# Patient Record
Sex: MALE | Race: WHITE | NOT HISPANIC OR LATINO | Employment: FULL TIME | URBAN - METROPOLITAN AREA
[De-identification: names, ages, dates, MRNs, and addresses within clinical notes are randomized per-mention and may not be internally consistent; named-entity substitution may affect disease eponyms.]

---

## 2023-03-25 PROBLEM — E78.5 HLD (HYPERLIPIDEMIA): Status: ACTIVE | Noted: 2023-03-25

## 2023-03-25 PROBLEM — F43.10 POSTTRAUMATIC STRESS DISORDER: Status: ACTIVE | Noted: 2023-03-25

## 2023-03-25 PROBLEM — R74.8 ELEVATED LIVER ENZYMES: Status: ACTIVE | Noted: 2023-03-25

## 2023-03-25 PROBLEM — U07.1 COVID-19: Status: ACTIVE | Noted: 2023-03-25

## 2023-03-25 PROBLEM — E55.9 VITAMIN D INSUFFICIENCY: Status: ACTIVE | Noted: 2023-03-25

## 2023-03-31 ENCOUNTER — OFFICE VISIT (OUTPATIENT)
Dept: PRIMARY CARE | Facility: CLINIC | Age: 43
End: 2023-03-31
Payer: COMMERCIAL

## 2023-03-31 VITALS
BODY MASS INDEX: 27.96 KG/M2 | RESPIRATION RATE: 14 BRPM | DIASTOLIC BLOOD PRESSURE: 70 MMHG | TEMPERATURE: 98.7 F | WEIGHT: 211 LBS | HEIGHT: 73 IN | SYSTOLIC BLOOD PRESSURE: 124 MMHG | HEART RATE: 55 BPM | OXYGEN SATURATION: 98 %

## 2023-03-31 DIAGNOSIS — F43.10 PTSD (POST-TRAUMATIC STRESS DISORDER): ICD-10-CM

## 2023-03-31 DIAGNOSIS — R74.8 ELEVATED LIVER ENZYMES: ICD-10-CM

## 2023-03-31 DIAGNOSIS — Z00.00 PERIODIC HEALTH ASSESSMENT, GENERAL SCREENING, ADULT: Primary | ICD-10-CM

## 2023-03-31 DIAGNOSIS — F98.8 ATTENTION DEFICIT DISORDER, UNSPECIFIED HYPERACTIVITY PRESENCE: ICD-10-CM

## 2023-03-31 PROCEDURE — 1036F TOBACCO NON-USER: CPT | Performed by: INTERNAL MEDICINE

## 2023-03-31 PROCEDURE — 99396 PREV VISIT EST AGE 40-64: CPT | Performed by: INTERNAL MEDICINE

## 2023-03-31 RX ORDER — BUPROPION HYDROCHLORIDE 150 MG/1
150 TABLET ORAL EVERY MORNING
Qty: 30 TABLET | Refills: 1 | Status: SHIPPED | OUTPATIENT
Start: 2023-03-31 | End: 2023-03-31 | Stop reason: SDUPTHER

## 2023-03-31 RX ORDER — BUPROPION HYDROCHLORIDE 150 MG/1
150 TABLET ORAL EVERY MORNING
Qty: 30 TABLET | Refills: 1 | Status: SHIPPED | OUTPATIENT
Start: 2023-03-31 | End: 2023-04-27

## 2023-03-31 ASSESSMENT — ENCOUNTER SYMPTOMS
CONFUSION: 1
CONSTIPATION: 0
WHEEZING: 0
NAUSEA: 0
COUGH: 0
PALPITATIONS: 0
SHORTNESS OF BREATH: 0
NERVOUS/ANXIOUS: 1
DIARRHEA: 0

## 2023-03-31 NOTE — PROGRESS NOTES
"Subjective   Patient ID: Rafael Mar is a 42 y.o. male who presents for Annual Exam (Npt physical .).  Overall doing well.  Patient is fairly active.  Denies any issues with CP,SOB or dizzy spells.  Denies any issues with HA, numbness or tingling.  No issues or changes with bowel or bladder habits.    He does have issues with stress and anxiety as well as issues with attention and what he describes as difficulty controlling impulses (for example getting in trouble at work for telling jokes)  ROS is otherwise unremarkable.       Review of Systems   Respiratory:  Negative for cough, shortness of breath and wheezing.    Cardiovascular:  Negative for chest pain, palpitations and leg swelling.   Gastrointestinal:  Negative for constipation, diarrhea and nausea.   Psychiatric/Behavioral:  Positive for confusion. The patient is nervous/anxious.        Objective   /70 (BP Location: Left arm, Patient Position: Sitting, BP Cuff Size: Adult)   Pulse 55   Temp 37.1 °C (98.7 °F) (Tympanic)   Resp 14   Ht 1.854 m (6' 1\")   Wt 95.7 kg (211 lb)   SpO2 98%   BMI 27.84 kg/m²     Physical Exam  Constitutional:       General: He is not in acute distress.     Appearance: Normal appearance. He is not ill-appearing.   HENT:      Head: Normocephalic and atraumatic.      Nose: Nose normal.   Eyes:      Extraocular Movements: Extraocular movements intact.      Conjunctiva/sclera: Conjunctivae normal.      Pupils: Pupils are equal, round, and reactive to light.   Cardiovascular:      Rate and Rhythm: Normal rate and regular rhythm.      Heart sounds: Normal heart sounds.   Pulmonary:      Effort: Pulmonary effort is normal.      Breath sounds: Normal breath sounds.   Abdominal:      General: There is no distension.   Musculoskeletal:         General: Normal range of motion.      Cervical back: Neck supple.   Neurological:      General: No focal deficit present.      Mental Status: He is alert.      Gait: Gait normal. "   Psychiatric:         Mood and Affect: Mood normal.         Behavior: Behavior normal.         Assessment/Plan   Problem List Items Addressed This Visit          Other    Elevated liver enzymes    Relevant Orders    Anti-Smooth Muscle Antibody    Ferritin    Transferrin    Sedimentation Rate     Other Visit Diagnoses       Periodic health assessment, general screening, adult    -  Primary    Relevant Medications    buPROPion XL (Wellbutrin XL) 150 mg 24 hr tablet    Other Relevant Orders    Anti-Smooth Muscle Antibody    Ferritin    Transferrin    Sedimentation Rate    Troponin I, High Sensitivity    CBC    Comprehensive Metabolic Panel    Lipid Panel    Thyroid Stimulating Hormone    Vitamin D, Total    Attention deficit disorder, unspecified hyperactivity presence        PTSD (post-traumatic stress disorder)        Relevant Orders    Referral to Psychiatry        We reviewed and discussed al of the above including previous blood test results.  He is requesting a troponin level.  He has not been having any exertional symptoms, but notices funny feeling with stress.    Physical exam is unremarkable.  We discussed the importance and benefits of a healthy diet that is both low in sugars and low in saturated fats.  We reviewed and discussed the benefits of regular physical exercise.  We also discussed the importance of stress management and good sleep hygiene.  He is agreeable to starting Wellbutrin and seeing psychiatrist.    We will continue to work on lifestyle improvements and follow-up in 2 months, sooner if any issues should arise.

## 2023-04-24 ENCOUNTER — LAB (OUTPATIENT)
Dept: LAB | Facility: LAB | Age: 43
End: 2023-04-24
Payer: COMMERCIAL

## 2023-04-24 DIAGNOSIS — Z00.00 PERIODIC HEALTH ASSESSMENT, GENERAL SCREENING, ADULT: ICD-10-CM

## 2023-04-24 DIAGNOSIS — R74.8 ELEVATED LIVER ENZYMES: ICD-10-CM

## 2023-04-24 LAB
CALCIDIOL (25 OH VITAMIN D3) (NG/ML) IN SER/PLAS: 17 NG/ML
ERYTHROCYTE DISTRIBUTION WIDTH (RATIO) BY AUTOMATED COUNT: 11.6 % (ref 11.5–14.5)
ERYTHROCYTE MEAN CORPUSCULAR HEMOGLOBIN CONCENTRATION (G/DL) BY AUTOMATED: 35.4 G/DL (ref 32–36)
ERYTHROCYTE MEAN CORPUSCULAR VOLUME (FL) BY AUTOMATED COUNT: 94 FL (ref 80–100)
ERYTHROCYTES (10*6/UL) IN BLOOD BY AUTOMATED COUNT: 4.54 X10E12/L (ref 4.5–5.9)
FERRITIN (UG/LL) IN SER/PLAS: 391 UG/L (ref 20–300)
HEMATOCRIT (%) IN BLOOD BY AUTOMATED COUNT: 42.9 % (ref 41–52)
HEMOGLOBIN (G/DL) IN BLOOD: 15.2 G/DL (ref 13.5–17.5)
LEUKOCYTES (10*3/UL) IN BLOOD BY AUTOMATED COUNT: 6.1 X10E9/L (ref 4.4–11.3)
PLATELETS (10*3/UL) IN BLOOD AUTOMATED COUNT: 224 X10E9/L (ref 150–450)
SEDIMENTATION RATE, ERYTHROCYTE: 3 MM/H (ref 0–15)
TRANSFERRIN (MG/DL) IN SER/PLAS: 268 MG/DL (ref 200–360)

## 2023-04-24 PROCEDURE — 80053 COMPREHEN METABOLIC PANEL: CPT

## 2023-04-24 PROCEDURE — 36415 COLL VENOUS BLD VENIPUNCTURE: CPT

## 2023-04-24 PROCEDURE — 84466 ASSAY OF TRANSFERRIN: CPT

## 2023-04-24 PROCEDURE — 84443 ASSAY THYROID STIM HORMONE: CPT

## 2023-04-24 PROCEDURE — 86255 FLUORESCENT ANTIBODY SCREEN: CPT

## 2023-04-24 PROCEDURE — 85027 COMPLETE CBC AUTOMATED: CPT

## 2023-04-24 PROCEDURE — 85652 RBC SED RATE AUTOMATED: CPT

## 2023-04-24 PROCEDURE — 82728 ASSAY OF FERRITIN: CPT

## 2023-04-24 PROCEDURE — 80061 LIPID PANEL: CPT

## 2023-04-24 PROCEDURE — 86256 FLUORESCENT ANTIBODY TITER: CPT

## 2023-04-24 PROCEDURE — 82306 VITAMIN D 25 HYDROXY: CPT

## 2023-04-24 PROCEDURE — 84484 ASSAY OF TROPONIN QUANT: CPT

## 2023-04-25 LAB
ALANINE AMINOTRANSFERASE (SGPT) (U/L) IN SER/PLAS: 52 U/L (ref 10–52)
ALBUMIN (G/DL) IN SER/PLAS: 4.8 G/DL (ref 3.4–5)
ALKALINE PHOSPHATASE (U/L) IN SER/PLAS: 69 U/L (ref 33–120)
ANION GAP IN SER/PLAS: 19 MMOL/L (ref 10–20)
ASPARTATE AMINOTRANSFERASE (SGOT) (U/L) IN SER/PLAS: 24 U/L (ref 9–39)
BILIRUBIN TOTAL (MG/DL) IN SER/PLAS: 2.5 MG/DL (ref 0–1.2)
CALCIUM (MG/DL) IN SER/PLAS: 9.7 MG/DL (ref 8.6–10.6)
CARBON DIOXIDE, TOTAL (MMOL/L) IN SER/PLAS: 21 MMOL/L (ref 21–32)
CHLORIDE (MMOL/L) IN SER/PLAS: 105 MMOL/L (ref 98–107)
CHOLESTEROL (MG/DL) IN SER/PLAS: 234 MG/DL (ref 0–199)
CHOLESTEROL IN HDL (MG/DL) IN SER/PLAS: 42.6 MG/DL
CHOLESTEROL/HDL RATIO: 5.5
CREATININE (MG/DL) IN SER/PLAS: 1.17 MG/DL (ref 0.5–1.3)
GFR MALE: 79 ML/MIN/1.73M2
GLUCOSE (MG/DL) IN SER/PLAS: 104 MG/DL (ref 74–99)
LDL: ABNORMAL MG/DL (ref 0–99)
NON HDL CHOLESTEROL: 191 MG/DL
POTASSIUM (MMOL/L) IN SER/PLAS: 4.4 MMOL/L (ref 3.5–5.3)
PROTEIN TOTAL: 7.1 G/DL (ref 6.4–8.2)
SODIUM (MMOL/L) IN SER/PLAS: 141 MMOL/L (ref 136–145)
THYROTROPIN (MIU/L) IN SER/PLAS BY DETECTION LIMIT <= 0.05 MIU/L: 2.13 MIU/L (ref 0.44–3.98)
TRIGLYCERIDE (MG/DL) IN SER/PLAS: 402 MG/DL (ref 0–149)
TROPONIN I, HIGH SENSITIVITY: <3 NG/L (ref 0–53)
UREA NITROGEN (MG/DL) IN SER/PLAS: 18 MG/DL (ref 6–23)
VLDL: ABNORMAL MG/DL (ref 0–40)

## 2023-04-27 ENCOUNTER — OFFICE VISIT (OUTPATIENT)
Dept: PRIMARY CARE | Facility: CLINIC | Age: 43
End: 2023-04-27
Payer: COMMERCIAL

## 2023-04-27 VITALS
WEIGHT: 211 LBS | TEMPERATURE: 98.7 F | OXYGEN SATURATION: 98 % | BODY MASS INDEX: 27.96 KG/M2 | SYSTOLIC BLOOD PRESSURE: 100 MMHG | DIASTOLIC BLOOD PRESSURE: 60 MMHG | HEIGHT: 73 IN | RESPIRATION RATE: 14 BRPM | HEART RATE: 70 BPM

## 2023-04-27 DIAGNOSIS — E78.1 HYPERTRIGLYCERIDEMIA: Primary | ICD-10-CM

## 2023-04-27 DIAGNOSIS — M25.561 CHRONIC PAIN OF RIGHT KNEE: ICD-10-CM

## 2023-04-27 DIAGNOSIS — G89.29 CHRONIC PAIN OF RIGHT KNEE: ICD-10-CM

## 2023-04-27 DIAGNOSIS — E78.5 HYPERLIPIDEMIA, UNSPECIFIED HYPERLIPIDEMIA TYPE: ICD-10-CM

## 2023-04-27 DIAGNOSIS — F43.10 PTSD (POST-TRAUMATIC STRESS DISORDER): ICD-10-CM

## 2023-04-27 DIAGNOSIS — R79.89 LOW VITAMIN D LEVEL: ICD-10-CM

## 2023-04-27 PROCEDURE — 1036F TOBACCO NON-USER: CPT | Performed by: INTERNAL MEDICINE

## 2023-04-27 PROCEDURE — 99213 OFFICE O/P EST LOW 20 MIN: CPT | Performed by: INTERNAL MEDICINE

## 2023-04-27 RX ORDER — CHOLECALCIFEROL (VITAMIN D3) 1250 MCG
50000 TABLET ORAL
Qty: 4 TABLET | Refills: 1 | Status: SHIPPED | OUTPATIENT
Start: 2023-04-27 | End: 2023-06-26

## 2023-04-27 RX ORDER — VENLAFAXINE HYDROCHLORIDE 75 MG/1
CAPSULE, EXTENDED RELEASE ORAL
COMMUNITY
Start: 2023-04-26 | End: 2023-11-15

## 2023-04-27 ASSESSMENT — ENCOUNTER SYMPTOMS
ARTHRALGIAS: 1
SHORTNESS OF BREATH: 0
COUGH: 0
ABDOMINAL PAIN: 0
CONSTIPATION: 0
WHEEZING: 0
DIARRHEA: 0

## 2023-04-27 NOTE — PROGRESS NOTES
"Subjective   Patient ID: Rafael Mar is a 42 y.o. male who presents for Follow-up (Follow up lab work.).  He has been feeling well with the exception of his chronic knee pain.    We discussed his current medications as well as his recent labs.  We reviewed and discussed his dietary habits as well.      Review of Systems   HENT:  Positive for tinnitus.    Respiratory:  Negative for cough, shortness of breath and wheezing.    Cardiovascular:  Negative for chest pain.   Gastrointestinal:  Negative for abdominal pain, constipation and diarrhea.   Musculoskeletal:  Positive for arthralgias.        Rt knee pain       Objective   /60 (BP Location: Left arm, Patient Position: Sitting, BP Cuff Size: Adult)   Pulse 70   Temp 37.1 °C (98.7 °F) (Tympanic)   Resp 14   Ht 1.854 m (6' 1\")   Wt 95.7 kg (211 lb)   SpO2 98%   BMI 27.84 kg/m²     Physical Exam    Assessment/Plan   Problem List Items Addressed This Visit          Other    HLD (hyperlipidemia)    Relevant Orders    Lipid Panel     Other Visit Diagnoses       Hypertriglyceridemia    -  Primary    Relevant Medications    fish oil (Omega-3) 60- mg capsule    PTSD (post-traumatic stress disorder)        Low vitamin D level        Relevant Medications    cholecalciferol (Vitamin D3) 1,250 mcg (50,000 unit) tablet    Chronic pain of right knee        Relevant Orders    Referral to Orthopaedic Surgery        We discussed all of the above.  Discussed potential benefit from low carb and low sugar diet as well as regular exercise as able.  We will add fish oil and rechek lipid panel in 4-6 months.  He should follow up at that time- sooner if any issues.         "

## 2023-04-28 LAB — ANTI-SMOOTH MUSCLE ANTIBODY: ABNORMAL

## 2023-05-22 DIAGNOSIS — R52 PAIN: Primary | ICD-10-CM

## 2023-08-23 ENCOUNTER — LAB (OUTPATIENT)
Dept: LAB | Facility: LAB | Age: 43
End: 2023-08-23
Payer: COMMERCIAL

## 2023-08-23 DIAGNOSIS — E78.5 HYPERLIPIDEMIA, UNSPECIFIED HYPERLIPIDEMIA TYPE: ICD-10-CM

## 2023-08-23 LAB
CHOLESTEROL (MG/DL) IN SER/PLAS: 247 MG/DL (ref 0–199)
CHOLESTEROL IN HDL (MG/DL) IN SER/PLAS: 41.6 MG/DL
CHOLESTEROL/HDL RATIO: 5.9
LDL: 156 MG/DL (ref 0–99)
NON HDL CHOLESTEROL: 205 MG/DL
TRIGLYCERIDE (MG/DL) IN SER/PLAS: 247 MG/DL (ref 0–149)
VLDL: 49 MG/DL (ref 0–40)

## 2023-08-23 PROCEDURE — 80061 LIPID PANEL: CPT

## 2023-08-23 PROCEDURE — 36415 COLL VENOUS BLD VENIPUNCTURE: CPT

## 2023-08-28 ENCOUNTER — OFFICE VISIT (OUTPATIENT)
Dept: PRIMARY CARE | Facility: CLINIC | Age: 43
End: 2023-08-28
Payer: COMMERCIAL

## 2023-08-28 VITALS
SYSTOLIC BLOOD PRESSURE: 124 MMHG | OXYGEN SATURATION: 99 % | HEART RATE: 65 BPM | RESPIRATION RATE: 18 BRPM | BODY MASS INDEX: 27.05 KG/M2 | TEMPERATURE: 98 F | DIASTOLIC BLOOD PRESSURE: 86 MMHG | WEIGHT: 205 LBS

## 2023-08-28 DIAGNOSIS — E78.2 MIXED HYPERLIPIDEMIA: Primary | ICD-10-CM

## 2023-08-28 DIAGNOSIS — F43.10 PTSD (POST-TRAUMATIC STRESS DISORDER): ICD-10-CM

## 2023-08-28 PROBLEM — R74.8 ELEVATED LIVER ENZYMES: Status: RESOLVED | Noted: 2023-03-25 | Resolved: 2023-08-28

## 2023-08-28 PROCEDURE — 99212 OFFICE O/P EST SF 10 MIN: CPT | Performed by: INTERNAL MEDICINE

## 2023-08-28 PROCEDURE — 1036F TOBACCO NON-USER: CPT | Performed by: INTERNAL MEDICINE

## 2023-08-28 RX ORDER — MODAFINIL 200 MG/1
TABLET ORAL
COMMUNITY
Start: 2023-08-23 | End: 2023-11-22

## 2023-08-28 RX ORDER — PAROXETINE HYDROCHLORIDE 20 MG/1
40 TABLET, FILM COATED ORAL DAILY
COMMUNITY
End: 2023-11-22 | Stop reason: WASHOUT

## 2023-08-28 NOTE — PROGRESS NOTES
Subjective   Patient ID: Rafael Mar is a 42 y.o. male who presents for Hyperlipidemia.    Hyperlipidemia     Lab follow up  Feeling well.  He has been taking fish oil without any issue.    We discussed his lab - triglycerides are down but LDL is up.    LFTs are back down.  He had his previous lab checked in close proximity to his COVID infection and he feels the elevation may have been from COVID.    He has been feeling well and is without any complaints currently.     Review of Systems  ROS is unremarkable.      Objective   /86   Pulse 65   Temp 36.7 °C (98 °F)   Resp 18   Wt 93 kg (205 lb)   SpO2 99%   BMI 27.05 kg/m²     Physical Exam  Vitals reviewed.   Constitutional:       Appearance: Normal appearance.   HENT:      Head: Normocephalic.   Cardiovascular:      Rate and Rhythm: Normal rate.   Pulmonary:      Effort: Pulmonary effort is normal.   Musculoskeletal:         General: Normal range of motion.   Neurological:      General: No focal deficit present.      Mental Status: He is alert.   Psychiatric:         Mood and Affect: Mood normal.       Assessment/Plan   Problem List Items Addressed This Visit       Mixed hyperlipidemia - Primary     Other Visit Diagnoses       PTSD (post-traumatic stress disorder)            LFTs are normal.  Triglycerides are down with fish oil and dietary changes.  LDL is up.  He does not wish to go on additional medication.  We discussed low fat diet.  [facility] and we can reassess at that point in time.

## 2023-10-02 ENCOUNTER — OFFICE VISIT (OUTPATIENT)
Dept: ORTHOPEDIC SURGERY | Facility: CLINIC | Age: 43
End: 2023-10-02
Payer: COMMERCIAL

## 2023-10-02 DIAGNOSIS — M23.91 INTERNAL DERANGEMENT OF RIGHT KNEE: ICD-10-CM

## 2023-10-02 PROCEDURE — 99213 OFFICE O/P EST LOW 20 MIN: CPT | Performed by: ORTHOPAEDIC SURGERY

## 2023-10-02 PROCEDURE — 99203 OFFICE O/P NEW LOW 30 MIN: CPT | Performed by: ORTHOPAEDIC SURGERY

## 2023-10-02 PROCEDURE — 1036F TOBACCO NON-USER: CPT | Performed by: ORTHOPAEDIC SURGERY

## 2023-10-02 NOTE — PROGRESS NOTES
History of Present Illness   No chief complaint on file.      The patient presents today endorsing side: right knee pain. The pain localizes over the medial joint line.  The patient endorses a twisting injury and the acute onset of pain, denying antecedent symptoms.  The pain is achy, constant, worse with activity and better with rest. The patient notes occasional locking, catching and giving out.  The patient has tried the following modalities Rest, ice, elevation.    Patient has notable history of meniscus repair in 1994.  Patient did fairly well however in the last year has had increasing amount of mechanical symptoms and pain.  He felt a sudden pop about 6 months ago.  Ever since that time it really been plaguing him.  He is having aching pain and soreness.  He traces it swells up.  His mechanical catching.  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    Past Medical History:   Diagnosis Date    Elevated liver enzymes 03/25/2023       Medication Documentation Review Audit       Reviewed by Kelley Juan on 10/02/23 at 1001      Medication Order Taking? Sig Documenting Provider Last Dose Status   fish oil (Omega-3) 60- mg capsule [id number] No Take 2 capsules (1,000 mg) by mouth 2 times a day. Henrry Brady,  Taking Active   modafinil (Provigil) 200 mg tablet [id number] No Take by mouth. Historical Provider, MD Taking Active   PARoxetine (Paxil) 20 mg tablet [id number] No Take 2 tablets (40 mg) by mouth once daily. Historical Provider, MD Taking Active   venlafaxine XR (Effexor-XR) 75 mg 24 hr capsule [id number] No  Historical Provider, MD Not Taking Active                    Allergies   Allergen Reactions    Other Unknown       Social History     Socioeconomic History    Marital status:      Spouse name: Not on file    Number of children: Not on file    Years of education: Not on file    Highest education level: Not on file   Occupational History    Not on file   Tobacco Use     Smoking status: Never    Smokeless tobacco: Never   Vaping Use    Vaping Use: Never used   Substance and Sexual Activity    Alcohol use: Not Currently    Drug use: Not Currently    Sexual activity: Not Currently   Other Topics Concern    Not on file   Social History Narrative    Not on file     Social Determinants of Health     Financial Resource Strain: Not on file   Food Insecurity: Not on file   Transportation Needs: Not on file   Physical Activity: Not on file   Stress: Not on file   Social Connections: Not on file   Intimate Partner Violence: Not on file   Housing Stability: Not on file       Past Surgical History:   Procedure Laterality Date    OTHER SURGICAL HISTORY  02/01/2021    Knee surgery    OTHER SURGICAL HISTORY  02/01/2021    Kidney surgery          Review of Systems   GENERAL: Negative for malaise, significant weight loss, fever  MUSCULOSKELETAL: See HPI  NEURO:  Negative for numbness / tingling      Physical Exam  side: right Knee:  Skin healthy and intact  No gross swelling or ecchymosis  No significant varus or valgus malalignment  Effusion: mild  ROM: diminished range of motion  Full flexion   Full extension  No pain with internal rotation of the hip  Tenderness to palpation:  medial joint line      No laxity to valgus stress  No laxity to varus stress  Negative Lachman´s test  Negative anterior drawer test  Negative posterior drawer test  Positive Lucas´s test  Neurovascular exam normal distally     Imaging  see dictated report from today  X-rays right knee: Patient has a well-preserved joint space.  Some slight medial compartment arthritis grade 1 with greater than 50% of joint space remaining.     Assessment:    Patient with side: right knee pain concern for meniscal tear       Plan:  We discussed with the patient concern for a meniscal tear.   We reviewed the natural history of meniscal pathology and discussed the implications for the health of the joint.  1.  Plan for MRI of his right  knee.  Would highly image quality based on his recurrent suspected tear from a prior surgical repair  2.  Medrol Dosepak  3.  Ibuprofen 800 milligram prescription prescription sent  4.  Ice   5.  Knee brace over-the-counter    Various treatment options were discussed and the patient elected for follow-up after MRI

## 2023-10-02 NOTE — LETTER
October 2, 2023     Henrry Brady DO  [address]  [facility] [place]  [facility] [address]    Patient: Rafael Mar   YOB: 1980   Date of Visit: 10/2/2023       Dear Dr. Henrry Brady DO:    Thank you for referring Rafael Mar to me for evaluation. Below are my notes for this consultation.  If you have questions, please do not hesitate to call me. I look forward to following your patient along with you.       Sincerely,     Mehul Corrales MD      CC: No Recipients  ______________________________________________________________________________________      History of Present Illness   No chief complaint on file.      The patient presents today endorsing side: right knee pain. The pain localizes over the medial joint line.  The patient endorses a twisting injury and the acute onset of pain, denying antecedent symptoms.  The pain is achy, constant, worse with activity and better with rest. The patient notes occasional locking, catching and giving out.  The patient has tried the following modalities Rest, ice, elevation.    Patient has notable history of meniscus repair in 1994.  Patient did fairly well however in the last year has had increasing amount of mechanical symptoms and pain.  He felt a sudden pop about 6 months ago.  Ever since that time it really been plaguing him.  He is having aching pain and soreness.  He traces it swells up.  His mechanical catching.  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    Past Medical History:   Diagnosis Date   • Elevated liver enzymes 03/25/2023       Medication Documentation Review Audit       Reviewed by Kelley Juan on 10/02/23 at 1001      Medication Order Taking? Sig Documenting Provider Last Dose Status   fish oil (Omega-3) 60- mg capsule [id number] No Take 2 capsules (1,000 mg) by mouth 2 times a day. Henrry Brady DO Taking Active   modafinil (Provigil) 200 mg tablet [id number] No Take by mouth. Historical Provider, MD Taking  Active   PARoxetine (Paxil) 20 mg tablet [id number] No Take 2 tablets (40 mg) by mouth once daily. Historical Provider, MD Taking Active   venlafaxine XR (Effexor-XR) 75 mg 24 hr capsule [id number] No  Historical Provider, MD Not Taking Active                    Allergies   Allergen Reactions   • Other Unknown       Social History     Socioeconomic History   • Marital status:      Spouse name: Not on file   • Number of children: Not on file   • Years of education: Not on file   • Highest education level: Not on file   Occupational History   • Not on file   Tobacco Use   • Smoking status: Never   • Smokeless tobacco: Never   Vaping Use   • Vaping Use: Never used   Substance and Sexual Activity   • Alcohol use: Not Currently   • Drug use: Not Currently   • Sexual activity: Not Currently   Other Topics Concern   • Not on file   Social History Narrative   • Not on file     Social Determinants of Health     Financial Resource Strain: Not on file   Food Insecurity: Not on file   Transportation Needs: Not on file   Physical Activity: Not on file   Stress: Not on file   Social Connections: Not on file   Intimate Partner Violence: Not on file   Housing Stability: Not on file       Past Surgical History:   Procedure Laterality Date   • OTHER SURGICAL HISTORY  02/01/2021    Knee surgery   • OTHER SURGICAL HISTORY  02/01/2021    Kidney surgery          Review of Systems   GENERAL: Negative for malaise, significant weight loss, fever  MUSCULOSKELETAL: See HPI  NEURO:  Negative for numbness / tingling      Physical Exam  side: right Knee:  Skin healthy and intact  No gross swelling or ecchymosis  No significant varus or valgus malalignment  Effusion: mild  ROM: diminished range of motion  Full flexion   Full extension  No pain with internal rotation of the hip  Tenderness to palpation:  medial joint line      No laxity to valgus stress  No laxity to varus stress  Negative Lachman´s test  Negative anterior drawer test  Negative  posterior drawer test  Positive Lucas´s test  Neurovascular exam normal distally     Imaging  see dictated report from today  X-rays right knee: Patient has a well-preserved joint space.  Some slight medial compartment arthritis grade 1 with greater than 50% of joint space remaining.     Assessment:    Patient with side: right knee pain concern for meniscal tear       Plan:  We discussed with the patient concern for a meniscal tear.   We reviewed the natural history of meniscal pathology and discussed the implications for the health of the joint.  1.  Plan for MRI of his right knee.  Would highly image quality based on his recurrent suspected tear from a prior surgical repair  2.  Medrol Dosepak  3.  Ibuprofen 800 milligram prescription prescription sent  4.  Ice   5.  Knee brace over-the-counter    Various treatment options were discussed and the patient elected for follow-up after MRI

## 2023-10-10 ENCOUNTER — TELEPHONE (OUTPATIENT)
Dept: ORTHOPEDIC SURGERY | Facility: CLINIC | Age: 43
End: 2023-10-10
Payer: COMMERCIAL

## 2023-10-11 ENCOUNTER — LAB (OUTPATIENT)
Dept: LAB | Facility: LAB | Age: 43
End: 2023-10-11
Payer: COMMERCIAL

## 2023-10-11 DIAGNOSIS — G47.26 CIRCADIAN RHYTHM SLEEP DISORDER, SHIFT WORK TYPE: Primary | ICD-10-CM

## 2023-10-11 LAB
AMPHETAMINES UR QL SCN: NORMAL
BARBITURATES UR QL SCN: NORMAL
BENZODIAZ UR QL SCN: NORMAL
BZE UR QL SCN: NORMAL
CANNABINOIDS UR QL SCN: NORMAL
FENTANYL+NORFENTANYL UR QL SCN: NORMAL
OPIATES UR QL SCN: NORMAL
OXYCODONE+OXYMORPHONE UR QL SCN: NORMAL
PCP UR QL SCN: NORMAL

## 2023-10-11 PROCEDURE — 80307 DRUG TEST PRSMV CHEM ANLYZR: CPT

## 2023-10-11 PROCEDURE — 36415 COLL VENOUS BLD VENIPUNCTURE: CPT

## 2023-10-12 ENCOUNTER — TELEPHONE (OUTPATIENT)
Dept: ORTHOPEDIC SURGERY | Facility: CLINIC | Age: 43
End: 2023-10-12
Payer: COMMERCIAL

## 2023-10-12 ENCOUNTER — TELEMEDICINE (OUTPATIENT)
Dept: BEHAVIORAL HEALTH | Facility: CLINIC | Age: 43
End: 2023-10-12
Payer: COMMERCIAL

## 2023-10-12 DIAGNOSIS — F43.10 POSTTRAUMATIC STRESS DISORDER: Primary | ICD-10-CM

## 2023-10-12 DIAGNOSIS — M23.91 INTERNAL DERANGEMENT OF RIGHT KNEE: Primary | ICD-10-CM

## 2023-10-12 PROCEDURE — 90837 PSYTX W PT 60 MINUTES: CPT | Performed by: PSYCHOLOGIST

## 2023-10-12 RX ORDER — METHYLPREDNISOLONE 4 MG/1
4 TABLET ORAL ONCE
Qty: 21 TABLET | Refills: 0 | Status: SHIPPED | OUTPATIENT
Start: 2023-10-12 | End: 2023-10-12
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
## 2023-10-16 ENCOUNTER — HOSPITAL ENCOUNTER (OUTPATIENT)
Dept: RADIOLOGY | Facility: CLINIC | Age: 43
Discharge: HOME | End: 2023-10-16
Payer: COMMERCIAL

## 2023-10-16 DIAGNOSIS — M23.91 INTERNAL DERANGEMENT OF RIGHT KNEE: ICD-10-CM

## 2023-10-16 PROCEDURE — 73721 MRI JNT OF LWR EXTRE W/O DYE: CPT | Mod: RT

## 2023-10-16 PROCEDURE — 73721 MRI JNT OF LWR EXTRE W/O DYE: CPT | Mod: RIGHT SIDE | Performed by: RADIOLOGY

## 2023-10-17 ENCOUNTER — TELEPHONE (OUTPATIENT)
Dept: ORTHOPEDIC SURGERY | Facility: CLINIC | Age: 43
End: 2023-10-17
Payer: COMMERCIAL

## 2023-10-19 ENCOUNTER — TELEMEDICINE (OUTPATIENT)
Dept: BEHAVIORAL HEALTH | Facility: CLINIC | Age: 43
End: 2023-10-19
Payer: COMMERCIAL

## 2023-10-19 DIAGNOSIS — F43.10 POSTTRAUMATIC STRESS DISORDER: Primary | ICD-10-CM

## 2023-10-19 PROCEDURE — 90837 PSYTX W PT 60 MINUTES: CPT | Performed by: PSYCHOLOGIST
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
## 2023-10-23 ENCOUNTER — OFFICE VISIT (OUTPATIENT)
Dept: ORTHOPEDIC SURGERY | Facility: CLINIC | Age: 43
End: 2023-10-23
Payer: COMMERCIAL

## 2023-10-23 DIAGNOSIS — M23.91 INTERNAL DERANGEMENT OF RIGHT KNEE: Primary | ICD-10-CM

## 2023-10-23 PROCEDURE — 99214 OFFICE O/P EST MOD 30 MIN: CPT | Performed by: ORTHOPAEDIC SURGERY

## 2023-10-23 PROCEDURE — 2500000004 HC RX 250 GENERAL PHARMACY W/ HCPCS (ALT 636 FOR OP/ED): Performed by: ORTHOPAEDIC SURGERY

## 2023-10-23 PROCEDURE — 20610 DRAIN/INJ JOINT/BURSA W/O US: CPT | Mod: RT | Performed by: ORTHOPAEDIC SURGERY

## 2023-10-23 PROCEDURE — 1036F TOBACCO NON-USER: CPT | Performed by: ORTHOPAEDIC SURGERY

## 2023-10-23 PROCEDURE — 2500000005 HC RX 250 GENERAL PHARMACY W/O HCPCS: Performed by: ORTHOPAEDIC SURGERY

## 2023-10-23 RX ORDER — LIDOCAINE HYDROCHLORIDE 10 MG/ML
8 INJECTION INFILTRATION; PERINEURAL
Status: COMPLETED | OUTPATIENT
Start: 2023-10-23 | End: 2023-10-23

## 2023-10-23 RX ORDER — TRIAMCINOLONE ACETONIDE 40 MG/ML
40 INJECTION, SUSPENSION INTRA-ARTICULAR; INTRAMUSCULAR
Status: COMPLETED | OUTPATIENT
Start: 2023-10-23 | End: 2023-10-23

## 2023-10-23 RX ADMIN — TRIAMCINOLONE ACETONIDE 40 MG: 40 INJECTION, SUSPENSION INTRA-ARTICULAR; INTRAMUSCULAR at 08:59

## 2023-10-23 RX ADMIN — LIDOCAINE HYDROCHLORIDE 8 ML: 10 INJECTION, SOLUTION INFILTRATION; PERINEURAL at 08:59

## 2023-10-23 NOTE — PROGRESS NOTES
History of Present Illness   No chief complaint on file.      The patient presents today endorsing side: right knee pain. The pain localizes over the medial joint line.  The patient endorses a twisting injury and the acute onset of pain, denying antecedent symptoms.  The pain is achy, constant, worse with activity and better with rest. The patient notes occasional locking, catching and giving out.  The patient has tried the following modalities Rest, ice, elevation.    Patient has notable history of meniscus repair in 1994.  Patient did fairly well however in the last year has had increasing amount of mechanical symptoms and pain.  He felt a sudden pop about 6 months ago.  Ever since that time it really been plaguing him.  He is having aching pain and soreness.  He traces it swells up.  His mechanical catching.  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  Fortunately has not gotten any better since the last visit.    Past Medical History:   Diagnosis Date    Elevated liver enzymes 03/25/2023       Medication Documentation Review Audit       Reviewed by Kelley Juan on 10/02/23 at 1001      Medication Order Taking? Sig Documenting Provider Last Dose Status   fish oil (Omega-3) 60- mg capsule [id number] No Take 2 capsules (1,000 mg) by mouth 2 times a day. Henrry Brady, DO Taking Active   modafinil (Provigil) 200 mg tablet [id number] No Take by mouth. Historical Provider, MD Taking Active   PARoxetine (Paxil) 20 mg tablet [id number] No Take 2 tablets (40 mg) by mouth once daily. Historical Provider, MD Taking Active   venlafaxine XR (Effexor-XR) 75 mg 24 hr capsule [id number] No  Historical Provider, MD Not Taking Active                    Allergies   Allergen Reactions    Other Unknown       Social History     Socioeconomic History    Marital status:      Spouse name: Not on file    Number of children: Not on file    Years of education: Not on file    Highest education level: Not on  file   Occupational History    Not on file   Tobacco Use    Smoking status: Never    Smokeless tobacco: Never   Vaping Use    Vaping Use: Never used   Substance and Sexual Activity    Alcohol use: Not Currently    Drug use: Not Currently    Sexual activity: Not Currently   Other Topics Concern    Not on file   Social History Narrative    Not on file     Social Determinants of Health     Financial Resource Strain: Not on file   Food Insecurity: Not on file   Transportation Needs: Not on file   Physical Activity: Not on file   Stress: Not on file   Social Connections: Not on file   Intimate Partner Violence: Not on file   Housing Stability: Not on file       Past Surgical History:   Procedure Laterality Date    OTHER SURGICAL HISTORY  02/01/2021    Knee surgery    OTHER SURGICAL HISTORY  02/01/2021    Kidney surgery          Review of Systems   GENERAL: Negative for malaise, significant weight loss, fever  MUSCULOSKELETAL: See HPI  NEURO:  Negative for numbness / tingling      Physical Exam  side: right Knee:  Skin healthy and intact  No gross swelling or ecchymosis  No significant varus or valgus malalignment  Effusion: mild  ROM: diminished range of motion  Full flexion   Full extension  No pain with internal rotation of the hip  Tenderness to palpation:  medial joint line      No laxity to valgus stress  No laxity to varus stress  Negative Lachman´s test  Negative anterior drawer test  Negative posterior drawer test  Positive Lucas´s test  Neurovascular exam normal distally     Imaging  see dictated report from today  X-rays right knee: Patient has a well-preserved joint space.  Some slight medial compartment arthritis grade 1 with greater than 50% of joint space remaining.  MRI: Patient has no obvious meniscus tear in the posterior horn.  He does have some irregularity which could result in a partial meniscus tear given his prior knee scope.  He does have some mild arthritic changes medial compartment      Assessment:    Right knee arthritis flare with possible recurrent meniscus tear  Plan:  We discussed with the patient concern for a meniscal tear.   We reviewed the natural history of meniscal pathology and discussed the implications for the health of the joint.  1.  Recommended cortisone injection today  2.  Follow-up in 4 weeks for ongoing care.  If mechanical symptoms persist discussed the diagnostic arthroscopy  3.  Ibuprofen 800 milligram prescription prescription sent  4.  Ice   5.  Knee brace over-the-counter    L Inj/Asp: R knee on 10/23/2023 8:59 AM  Indications: pain  Details: 22 G needle, anterolateral approach  Medications: 8 mL lidocaine 10 mg/mL (1 %); 40 mg triamcinolone acetonide 40 mg/mL  Outcome: tolerated well, no immediate complications  Procedure, treatment alternatives, risks and benefits explained, specific risks discussed. Consent was given by the patient. Immediately prior to procedure a time out was called to verify the correct patient, procedure, equipment, support staff and site/side marked as required. Patient was prepped and draped in the usual sterile fashion.

## 2023-10-26 ENCOUNTER — APPOINTMENT (OUTPATIENT)
Dept: BEHAVIORAL HEALTH | Facility: CLINIC | Age: 43
End: 2023-10-26
Payer: COMMERCIAL

## 2023-11-07 ENCOUNTER — APPOINTMENT (OUTPATIENT)
Dept: BEHAVIORAL HEALTH | Facility: CLINIC | Age: 43
End: 2023-11-07
Payer: COMMERCIAL

## 2023-11-15 PROBLEM — U07.1 COVID-19: Status: RESOLVED | Noted: 2023-03-25 | Resolved: 2023-11-15

## 2023-11-15 RX ORDER — METHYLPREDNISOLONE 4 MG/1
TABLET ORAL
COMMUNITY
Start: 2023-10-12

## 2023-11-15 RX ORDER — PAROXETINE HYDROCHLORIDE 40 MG/1
40 TABLET, FILM COATED ORAL DAILY
COMMUNITY
Start: 2023-10-11 | End: 2023-11-22 | Stop reason: SDUPTHER

## 2023-11-20 ENCOUNTER — OFFICE VISIT (OUTPATIENT)
Dept: ORTHOPEDIC SURGERY | Facility: CLINIC | Age: 43
End: 2023-11-20
Payer: COMMERCIAL

## 2023-11-20 DIAGNOSIS — Z98.890 S/P LEFT KNEE ARTHROSCOPY: Primary | ICD-10-CM

## 2023-11-20 PROCEDURE — 99213 OFFICE O/P EST LOW 20 MIN: CPT | Performed by: ORTHOPAEDIC SURGERY

## 2023-11-20 PROCEDURE — 1036F TOBACCO NON-USER: CPT | Performed by: ORTHOPAEDIC SURGERY

## 2023-11-20 NOTE — PROGRESS NOTES
History of Present Illness   No chief complaint on file.      The patient presents today endorsing side: right knee pain. The pain localizes over the medial joint line.  The patient endorses a twisting injury and the acute onset of pain, denying antecedent symptoms.  The pain is achy, constant, worse with activity and better with rest. The patient notes occasional locking, catching and giving out.  The patient has tried the following modalities Rest, ice, elevation.    Patient has notable history of meniscus repair in 1994.  Patient did fairly well however in the last year has had increasing amount of mechanical symptoms and pain.  He felt a sudden pop about 6 months ago.  Ever since that time it really been plaguing him.  He is having aching pain and soreness.  He traces it swells up.  His mechanical catching.  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    Overall he states his knee pain is somewhat better.  However, he still complaining of a little bit of subtle instability.  Feels just a little loose at times.  Ibuprofen does help    Past Medical History:   Diagnosis Date    Elevated liver enzymes 03/25/2023       Medication Documentation Review Audit       Reviewed by Kelley Juan on 10/02/23 at 1001      Medication Order Taking? Sig Documenting Provider Last Dose Status   fish oil (Omega-3) 60- mg capsule [id number] No Take 2 capsules (1,000 mg) by mouth 2 times a day. Henrry Brady, DO Taking Active   modafinil (Provigil) 200 mg tablet [id number] No Take by mouth. Historical Provider, MD Taking Active   PARoxetine (Paxil) 20 mg tablet [id number] No Take 2 tablets (40 mg) by mouth once daily. Historical Provider, MD Taking Active   venlafaxine XR (Effexor-XR) 75 mg 24 hr capsule [id number] No  Historical Provider, MD Not Taking Active                    Allergies   Allergen Reactions    Other Unknown       Social History     Socioeconomic History    Marital status:      Spouse  name: Not on file    Number of children: Not on file    Years of education: Not on file    Highest education level: Not on file   Occupational History    Not on file   Tobacco Use    Smoking status: Never    Smokeless tobacco: Never   Vaping Use    Vaping Use: Never used   Substance and Sexual Activity    Alcohol use: Not Currently    Drug use: Not Currently    Sexual activity: Not Currently   Other Topics Concern    Not on file   Social History Narrative    Not on file     Social Determinants of Health     Financial Resource Strain: Not on file   Food Insecurity: Not on file   Transportation Needs: Not on file   Physical Activity: Not on file   Stress: Not on file   Social Connections: Not on file   Intimate Partner Violence: Not on file   Housing Stability: Not on file       Past Surgical History:   Procedure Laterality Date    OTHER SURGICAL HISTORY  02/01/2021    Knee surgery    OTHER SURGICAL HISTORY  02/01/2021    Kidney surgery          Review of Systems   GENERAL: Negative for malaise, significant weight loss, fever  MUSCULOSKELETAL: See HPI  NEURO:  Negative for numbness / tingling      Physical Exam  side: right Knee:  Skin healthy and intact  No gross swelling or ecchymosis  No significant varus or valgus malalignment  Effusion: mild  ROM: diminished range of motion  Full flexion   Full extension  No pain with internal rotation of the hip  Tenderness to palpation:  medial joint line      No laxity to valgus stress  No laxity to varus stress  Negative Lachman´s test  Negative anterior drawer test  Negative posterior drawer test  Positive Lucas´s test  Neurovascular exam normal distally     Imaging  see dictated report from today  X-rays right knee: Patient has a well-preserved joint space.  Some slight medial compartment arthritis grade 1 with greater than 50% of joint space remaining.  MRI: Patient has no obvious meniscus tear in the posterior horn.  He does have some irregularity which could result in  a partial meniscus tear given his prior knee scope.  He does have some mild arthritic changes medial compartment     Assessment:    Right knee arthritis flare with possible recurrent meniscus tear  Plan:  We discussed with the patient concern for a meniscal tear.   We reviewed the natural history of meniscal pathology and discussed the implications for the health of the joint.  1.  Recommended formal physical therapy.  Discussed with him he sounds like he is having some subtle patellofemoral type complaints and we need to focus on strengthening and mechanics  2.  Follow-up in 6-8 weeks for ongoing care.  If mechanical symptoms persist discussed the diagnostic arthroscopy  3.  Ibuprofen 800 milligram prescription prescription sent  4.  Ice   5.  Knee brace over-the-counter

## 2023-11-22 ENCOUNTER — TELEMEDICINE (OUTPATIENT)
Dept: BEHAVIORAL HEALTH | Facility: CLINIC | Age: 43
End: 2023-11-22
Payer: COMMERCIAL

## 2023-11-22 DIAGNOSIS — G47.26 SHIFT WORK SLEEP DISORDER: ICD-10-CM

## 2023-11-22 DIAGNOSIS — F43.10 POSTTRAUMATIC STRESS DISORDER: ICD-10-CM

## 2023-11-22 PROCEDURE — 99214 OFFICE O/P EST MOD 30 MIN: CPT | Performed by: STUDENT IN AN ORGANIZED HEALTH CARE EDUCATION/TRAINING PROGRAM

## 2023-11-22 RX ORDER — PAROXETINE HYDROCHLORIDE 40 MG/1
40 TABLET, FILM COATED ORAL DAILY
Qty: 30 TABLET | Refills: 2 | Status: SHIPPED | OUTPATIENT
Start: 2023-11-22 | End: 2024-01-31

## 2023-11-22 ASSESSMENT — ANXIETY QUESTIONNAIRES
IF YOU CHECKED OFF ANY PROBLEMS ON THIS QUESTIONNAIRE, HOW DIFFICULT HAVE THESE PROBLEMS MADE IT FOR YOU TO DO YOUR WORK, TAKE CARE OF THINGS AT HOME, OR GET ALONG WITH OTHER PEOPLE: VERY DIFFICULT
5. BEING SO RESTLESS THAT IT IS HARD TO SIT STILL: SEVERAL DAYS
1. FEELING NERVOUS, ANXIOUS, OR ON EDGE: SEVERAL DAYS
GAD7 TOTAL SCORE: 6
2. NOT BEING ABLE TO STOP OR CONTROL WORRYING: NOT AT ALL
7. FEELING AFRAID AS IF SOMETHING AWFUL MIGHT HAPPEN: SEVERAL DAYS
4. TROUBLE RELAXING: NOT AT ALL
6. BECOMING EASILY ANNOYED OR IRRITABLE: NEARLY EVERY DAY
3. WORRYING TOO MUCH ABOUT DIFFERENT THINGS: NOT AT ALL

## 2023-11-22 NOTE — PROGRESS NOTES
"Outpatient Psychiatry Follow-Up    Subjective   Rafael Mar is a 43 y.o. male with PTSD worsening over the past few years, exacerbated by multiple work and life stressors as well as poor, fragmented sleep due to shift work.    Previous Treatment Plan  We discussed how, while stress is still high and sleep is still poor, the situation has become less overwhelming in the context of medications to help with anxiety and focus, practicing opening up to wife, and getting a bit more sleep. We decided to continue along this path, looking for any opportunities to get a rest, and I recommended the book, \"Lisa Little Sleep,\" to help sleep train the baby and hopefully be able to get some more rest. Remember to provide a urine sample for drug screen at earliest convenience.    Interim History  He was following with psychologist until a month ago.    Updates: No real change, high stress but getting by.   Sleep: No issues sleeping, just finding the time.  Work energy: Manageable but not great.  Provigil use: He doesn't feel much difference, would rather not take it.  Therapy: Has other things he wants to talk about, not go hard into the PTSD. Will discuss with therapist.    GAD7 score low today; only significant factor is anger/irritability, which is causing marital tension.    Psychiatric Medications  Paroxetine 40 mg daily  Modafinil 200 mg daily    Objective   Mental Status Exam:  General Appearance: Well groomed, appropriate eye contact  Attitude/Behavior: Cooperative  Motor: No psychomotor agitation or retardation, no tremor or other abnormal movements  Speech: Normal rate, volume, prosody  Mood: getting by  Affect: Euthymic, full-range  Thought Process: Linear, goal directed  Thought Associations: No loosening of associations  Thought Content: Normal  Perception: No perceptual abnormalities noted  Sensorium: Alert  Insight: Intact  Judgement: Intact  Cognition: Cognitively intact to conversational testing with respect " to attention, orientation, fund of knowledge, recent and remote memory, and language    OARRS:  Modafinil not listed  Roxy Oneil MD on 11/22/2023 12:03 PM  I have personally reviewed the OARRS report for Rafael Mar. I have considered the risks of abuse, dependence, addiction and diversion    Is the patient prescribed a combination of a benzodiazepine and opioid?  No    Last Urine Drug Screen / ordered today: No  Recent Results (from the past 8760 hour(s))   Drug Screen, Urine With Reflex to Confirmation    Collection Time: 10/11/23  8:33 AM   Result Value Ref Range    Amphetamine Screen, Urine Presumptive Negative Presumptive Negative    Barbiturate Screen, Urine Presumptive Negative Presumptive Negative    Benzodiazepines Screen, Urine Presumptive Negative Presumptive Negative    Cannabinoid Screen, Urine Presumptive Negative Presumptive Negative    Cocaine Metabolite Screen, Urine Presumptive Negative Presumptive Negative    Fentanyl Screen, Urine Presumptive Negative Presumptive Negative    Opiate Screen, Urine Presumptive Negative Presumptive Negative    Oxycodone Screen, Urine Presumptive Negative Presumptive Negative    PCP Screen, Urine Presumptive Negative Presumptive Negative     Results are as expected.       Lab Review:   Lab Results   Component Value Date     04/24/2023    K 4.4 04/24/2023     04/24/2023    CO2 21 04/24/2023    BUN 18 04/24/2023    CREATININE 1.17 04/24/2023    GLUCOSE 104 (H) 04/24/2023    CALCIUM 9.7 04/24/2023     Lab Results   Component Value Date    WBC 6.1 04/24/2023    HGB 15.2 04/24/2023    HCT 42.9 04/24/2023    MCV 94 04/24/2023     04/24/2023       Assessment/Plan     Problem List Items Addressed This Visit             ICD-10-CM    Posttraumatic stress disorder F43.10     Stress still high with isolated irritability but no other symptoms of anxiety. Will consider paroxetine effective.  - no med change  - continue therapy - pt will discuss loosening  up focus on PTSD         Relevant Medications    PARoxetine (Paxil) 40 mg tablet    Other Relevant Orders    Follow Up In Psychiatry    Shift work sleep disorder G47.26     Modafinil unhelpful; will stop         Relevant Orders    Follow Up In Psychiatry         Next appointment with me in 3 month(s).    Suicide Risk Assessment  There are no new risk factors for suicide and imminent risk remains low; therefore, Rafael Mar does not require further risk mitigation.    I provided the mobile crisis number and encouraged calling this, [phone] or 911 in case of a mental health crisis, including feeling suicidal or losing touch with reality.

## 2023-11-22 NOTE — PATIENT INSTRUCTIONS
"Fax: [phone]  Email: [email or web address]  Book: \"Lisa Little Sleep\"    Please send me a message in [email or web address] if things aren't going as expected or you are unsure about something we discussed. If this isn't working, you can call the psychiatry department line at [phone], or leave me a voicemail at [phone].  If you are having thoughts of harming yourself or ending your life, please call the national suicide hotline 24/7 at 031. For this and other mental health emergencies, such as losing touch with reality, call the [facility] 24/7 mobile crisis hotline at [phone], or dial 911 for emergency services.    "

## 2023-11-24 NOTE — ASSESSMENT & PLAN NOTE
Stress still high with isolated irritability but no other symptoms of anxiety. Will consider paroxetine effective.  - no med change  - continue therapy - pt will discuss loosening up focus on PTSD

## 2024-01-08 ENCOUNTER — APPOINTMENT (OUTPATIENT)
Dept: ORTHOPEDIC SURGERY | Facility: CLINIC | Age: 44
End: 2024-01-08
Payer: COMMERCIAL

## 2024-01-31 ENCOUNTER — TELEMEDICINE (OUTPATIENT)
Dept: BEHAVIORAL HEALTH | Facility: CLINIC | Age: 44
End: 2024-01-31
Payer: COMMERCIAL

## 2024-01-31 DIAGNOSIS — F43.10 POSTTRAUMATIC STRESS DISORDER: ICD-10-CM

## 2024-01-31 PROCEDURE — 99214 OFFICE O/P EST MOD 30 MIN: CPT | Performed by: STUDENT IN AN ORGANIZED HEALTH CARE EDUCATION/TRAINING PROGRAM

## 2024-01-31 RX ORDER — TOPIRAMATE 50 MG/1
TABLET, FILM COATED ORAL
Qty: 63 TABLET | Refills: 0 | Status: SHIPPED | OUTPATIENT
Start: 2024-01-31 | End: 2024-02-21 | Stop reason: SDUPTHER

## 2024-01-31 RX ORDER — PAROXETINE HYDROCHLORIDE HEMIHYDRATE 25 MG/1
TABLET, FILM COATED, EXTENDED RELEASE ORAL
Qty: 63 TABLET | Refills: 0 | Status: SHIPPED | OUTPATIENT
Start: 2024-01-31 | End: 2024-02-21 | Stop reason: SDUPTHER

## 2024-01-31 NOTE — PROGRESS NOTES
Outpatient Psychiatry Follow-Up    Enrique Mar is a 43 y.o. male with PTSD worsening over the past few years, exacerbated by multiple work and life stressors as well as poor, fragmented sleep due to shift work.     Previous Treatment Plan    ICD-10-CM         Posttraumatic stress disorder F43.10       Stress still high with isolated irritability but no other symptoms of anxiety. Will consider paroxetine effective.  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    Paxil stop: didn't feel like it was doing anything so stopped, neither he nor his wife noticed any differences.  Main issues: Attention and energy at work, had two anxiety episodes since our last appointment and had to leave. Depression has been really bad - doesn't enjoy anything. Anger not really an issue anymore.  The further he gets from home, the harder it is to get a full breath, tension.   These things were all increasing before he stopped paroxetine.  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    Psychiatric Medications  None currently    Trials:   Venlafaxine XR - severe n/v at 75 mg, could not tolerate  Modafinil - no sustained help with energy at work    Objective   Mental Status Exam:  General Appearance: Well groomed, appropriate eye contact  Attitude/Behavior: Cooperative  Motor: No psychomotor agitation or retardation, no tremor or other abnormal movements  Speech: Normal rate, volume, prosody  Mood: depressed  Affect: Dysphoric, constricted but reactive  Thought Process: Linear, goal directed  Thought Associations: No  loosening of associations  Thought Content: Normal  Perception: No perceptual abnormalities noted  Sensorium: Alert  Insight: Intact  Judgement: Intact  Cognition: Cognitively intact to conversational testing with respect to attention, orientation, fund of knowledge, recent and remote memory, and language    Lab Review:   not applicable    Assessment/Plan     Problem List Items Addressed This Visit             ICD-10-CM    Posttraumatic stress disorder F43.10     New physical anxiety (tension, difficulty breathing) that worsens when leaving home. In turn, this leads to depression. Paroxetine was likely helping with symptoms but only with partial improvement.  - restart paroxetine, this time as CR  - augment with topiramate for PTSD/agoraphobia symptoms  - if depression persistent, consider addition of Wellbutrin at next visit  - anticipate limited effects due to sleep deprivation and appropriate fatigue         Relevant Medications    PARoxetine CR (Paxil CR) 25 mg 24 hr tablet    topiramate (Topamax) 50 mg tablet       Next appointment with me in 1 month(s).    Suicide Risk Assessment  There are no new risk factors for suicide and imminent risk remains low; therefore, Rafael Mar does not require further risk mitigation.    I provided the mobile crisis number and encouraged calling this, [phone] or 911 in case of a mental health crisis, including feeling suicidal or losing touch with reality.

## 2024-02-01 NOTE — ASSESSMENT & PLAN NOTE
New physical anxiety (tension, difficulty breathing) that worsens when leaving home. In turn, this leads to depression. Paroxetine was likely helping with symptoms but only with partial improvement.  - restart paroxetine, this time as CR  - augment with topiramate for PTSD/agoraphobia symptoms  - if depression persistent, consider addition of Wellbutrin at next visit  - anticipate limited effects due to sleep deprivation and appropriate fatigue

## 2024-02-01 NOTE — PATIENT INSTRUCTIONS
Start: Paxil CR 25 mg daily  Day 4: Paxil CR 50 mg (2 tabs) daily  Day 7: add Topiramate 50 mg nightly (before bed)  Day 10: if well-tolerated, increased Topiramate to 50 mg twice daily    Any possible lifestyle changes to increase sleep duration and continuity.    Please send me a message in [employer] if things aren't going as expected or you are unsure about something we discussed. If this isn't working, you can call the psychiatry department line at [phone], or leave me a voicemail at [phone].  If you are having thoughts of harming yourself or ending your life, please call the national suicide hotline 24/7 at 181. For this and other mental health emergencies, such as losing touch with reality, call the [facility]line 24/7 mobile crisis hotline at 640-476-9693, or dial 911 for emergency services.

## 2024-02-21 ENCOUNTER — TELEMEDICINE (OUTPATIENT)
Dept: BEHAVIORAL HEALTH | Facility: CLINIC | Age: 44
End: 2024-02-21
Payer: COMMERCIAL

## 2024-02-21 DIAGNOSIS — F43.10 POSTTRAUMATIC STRESS DISORDER: ICD-10-CM

## 2024-02-21 DIAGNOSIS — G47.26 SHIFT WORK SLEEP DISORDER: ICD-10-CM

## 2024-02-21 PROCEDURE — 1036F TOBACCO NON-USER: CPT | Performed by: STUDENT IN AN ORGANIZED HEALTH CARE EDUCATION/TRAINING PROGRAM

## 2024-02-21 PROCEDURE — 99213 OFFICE O/P EST LOW 20 MIN: CPT | Performed by: STUDENT IN AN ORGANIZED HEALTH CARE EDUCATION/TRAINING PROGRAM

## 2024-02-21 RX ORDER — TOPIRAMATE 50 MG/1
50 TABLET, FILM COATED ORAL 2 TIMES DAILY
Qty: 60 TABLET | Refills: 2 | Status: SHIPPED | OUTPATIENT
Start: 2024-02-21 | End: 2024-05-21

## 2024-02-21 RX ORDER — PAROXETINE HYDROCHLORIDE HEMIHYDRATE 25 MG/1
50 TABLET, FILM COATED, EXTENDED RELEASE ORAL DAILY
Qty: 60 TABLET | Refills: 2 | Status: SHIPPED | OUTPATIENT
Start: 2024-02-21 | End: 2024-05-21

## 2024-02-21 RX ORDER — DIAZEPAM 5 MG/1
5 TABLET ORAL ONCE
Qty: 1 TABLET | Refills: 0 | Status: SHIPPED | OUTPATIENT
Start: 2024-02-21 | End: 2024-02-21

## 2024-02-21 NOTE — PROGRESS NOTES
Outpatient Psychiatry Follow-Up    Enrique Mar is a 43 y.o. male with PTSD worsening over the past few years, exacerbated by multiple work and life stressors as well as poor, fragmented sleep due to shift work.      Previous Treatment Plan    Posttraumatic stress disorder F43.10         Stress still high with isolated irritability but no other symptoms of anxiety. Will consider paroxetine effective.  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  Anxiety at work: Has been okay  Anxiety outside of house: Still bad - went out to eat and he wasn't able to stay, he felt guilty about it. Overall - he goes to the grocery store and does okay. The restaurant - something didn't feel good about it - he's been there numerous times before. This was about two weeks ago, which was about a week since starting paroxetine. Hasn't gone out anywhere since then.    Psychiatric Medications  Paroxetine 40 mg daily  Topiramate 50 mg BID - a little wave of nausea for 10 minutes or so after taking -he's been taking both at once    Objective   Mental Status Exam:  General Appearance: Well groomed, appropriate eye contact  Attitude/Behavior: Cooperative  Motor: No psychomotor agitation or retardation, no tremor or other abnormal movements  Speech: Normal rate, volume, prosody  Mood: fine  Affect: Euthymic, full-range  Thought Process: Linear, goal directed  Thought Associations: No loosening of associations  Thought Content: Normal  Perception: No perceptual abnormalities noted  Sensorium: Alert  Insight: Intact  Judgement: Intact  Cognition: Cognitively intact to  conversational testing with respect to attention, orientation, fund of knowledge, recent and remote memory, and language    Lab Review:   not applicable    Assessment/Plan     Problem List Items Addressed This Visit             ICD-10-CM    Posttraumatic stress disorder F43.10     Valium for son's birthday party tonight  Get a data point for agoraphobia now that meds are at effective duration  Can increase topiramate once not having nausea         Relevant Medications    diazePAM (Valium) 5 mg tablet    PARoxetine CR (Paxil CR) 25 mg 24 hr tablet    topiramate (Topamax) 50 mg tablet    Other Relevant Orders    Follow Up In Psychiatry    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    Suicide Risk Assessment  There are no new risk factors for suicide and imminent risk remains low; therefore, Rafael Mar does not require further risk mitigation.    I provided the mobile crisis number and encouraged calling this, 988 or 911 in case of a mental health crisis, including feeling suicidal or losing touch with reality.

## 2024-02-21 NOTE — ASSESSMENT & PLAN NOTE
Valium for son's birthday party tonight  Get a data point for agoraphobia now that meds are at effective duration  Can increase topiramate once not having nausea

## 2024-02-26 NOTE — PATIENT INSTRUCTIONS
Remember to try going to a restaurant or something now that medications have been on board long enough to maybe reduce agoraphobia symptoms.    Please send me a message in "[employer] if things aren't going as expected or you are unsure about something we discussed. If this isn't working, you can call the psychiatry department line at [phone], or leave me a voicemail at [phone].  If you are having thoughts of harming yourself or ending your life, please call the national suicide hotline 24/7 at 344. For this and other mental health emergencies, such as losing touch with reality, call the [facility] 24/7 mobile crisis hotline at 133-076-0097, or dial 1 for emergency services.

## 2024-03-04 ENCOUNTER — OFFICE VISIT (OUTPATIENT)
Dept: ORTHOPEDIC SURGERY | Facility: CLINIC | Age: 44
End: 2024-03-04
Payer: COMMERCIAL

## 2024-03-04 DIAGNOSIS — M23.91 INTERNAL DERANGEMENT OF RIGHT KNEE: Primary | ICD-10-CM

## 2024-03-04 DIAGNOSIS — S83.241A ACUTE MEDIAL MENISCUS TEAR OF RIGHT KNEE: Primary | ICD-10-CM

## 2024-03-04 PROCEDURE — 99213 OFFICE O/P EST LOW 20 MIN: CPT | Performed by: ORTHOPAEDIC SURGERY

## 2024-03-04 PROCEDURE — 1036F TOBACCO NON-USER: CPT | Performed by: ORTHOPAEDIC SURGERY

## 2024-03-04 NOTE — PROGRESS NOTES
History of Present Illness   No chief complaint on file.  Patient today unfortunately his right knee is not doing any better.  He may get 50% relief about a month out of his a cortisone injection in October.  His repeat is mechanical symptoms simply keeps swelling and sore in the front of the knee in extension.  He is very pointed joint line tenderness.  Had a remote knee arthroscopy and is teens  Patient has notable history of meniscus repair in 1994.  Patient did fairly well however in the last year has had increasing amount of mechanical symptoms and pain.  He felt a sudden pop about 6 months ago.  Ever since that time it really been plaguing him.  He is having aching pain and soreness.  He traces it swells up.  His mechanical catching.  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      Past Medical History:   Diagnosis Date    Elevated liver enzymes 03/25/2023       Medication Documentation Review Audit       Reviewed by Kelley Juan on 10/02/23 at 1001      Medication Order Taking? Sig Documenting Provider Last Dose Status   fish oil (Omega-3) 60- mg capsule [id number] No Take 2 capsules (1,000 mg) by mouth 2 times a day. Henrry Brady, DO Taking Active   modafinil (Provigil) 200 mg tablet [id number] No Take by mouth. Historical Provider, MD Taking Active   PARoxetine (Paxil) 20 mg tablet [id number] No Take 2 tablets (40 mg) by mouth once daily. Historical Provider, MD Taking Active   venlafaxine XR (Effexor-XR) 75 mg 24 hr capsule [id number] No  Historical Provider, MD Not Taking Active                    Allergies   Allergen Reactions    Other Unknown       Social History     Socioeconomic History    Marital status:      Spouse name: Not on file    Number of children: Not on file    Years of education: Not on file    Highest education level: Not on file   Occupational History    Not on file   Tobacco Use    Smoking status: Never    Smokeless tobacco: Never   Vaping Use    Vaping  Use: Never used   Substance and Sexual Activity    Alcohol use: Not Currently    Drug use: Not Currently    Sexual activity: Not Currently   Other Topics Concern    Not on file   Social History Narrative    Not on file     Social Determinants of Health     Financial Resource Strain: Not on file   Food Insecurity: Not on file   Transportation Needs: Not on file   Physical Activity: Not on file   Stress: Not on file   Social Connections: Not on file   Intimate Partner Violence: Not on file   Housing Stability: Not on file       Past Surgical History:   Procedure Laterality Date    OTHER SURGICAL HISTORY  02/01/2021    Knee surgery    OTHER SURGICAL HISTORY  02/01/2021    Kidney surgery          Review of Systems   GENERAL: Negative for malaise, significant weight loss, fever  MUSCULOSKELETAL: See HPI  NEURO:  Negative for numbness / tingling      Physical Exam  side: right Knee:  Skin healthy and intact  No gross swelling or ecchymosis  No significant varus or valgus malalignment  Effusion: Trace to 1+  ROM: diminished range of motion flexion to 1 Tensipine in full extension  5/5 quadricep strength, no pain with internal rotation of the hip  Tenderness to palpation:  medial joint line    Negative posterior drawer test  Positive Lucas´s test, positive Apley grind  Neurovascular exam normal distally     Imaging  see dictated report from today  X-rays right knee: Patient has a well-preserved joint space.  Some slight medial compartment arthritis grade 1 with greater than 50% of joint space remaining.  MRI: Patient has no obvious meniscus tear in the posterior horn.  He does have some irregularity which could result in a partial meniscus tear given his prior knee scope.  He does have some mild arthritic changes medial compartment     Assessment:    Right knee arthritis flare with possible recurrent meniscus tear  Plan:  At this point patient is failed all conservative measures including cortisone injections, formal  physical therapy, anti-inflammatories and bracing.  I recommended right knee diagnostic arthroscopy with a possible meniscectomy or surgeries as indicated    Risks benefits and alternatives to surgery were discussed including but not limited to Infection, bleeding, neurovascular injury, pain and dysfunction, hardware related complications including cutout failure breakage, loss of function, motion, and permanent disability as well as the cardiovascular and pulmonary complications from anesthesia including death and DVT. Patient and family accept these risks.  We discussed specifically post meniscectomy pain, incomplete pain relief, meniscus retear, progressive arthritis, intraoperative decisions in regards to other pathology, and the potential for future revision surgeries including arthroplasty    Plan for outpatient surgery   1. 1 week postop follow up   2. Percocet for postop pain relief. OARRS has been reviewed and is consistent with prescribed medications. This report is scanned into the electronic medical record. The risks of abuse, dependence, addiction and diversion were considered. The medication is felt to be clinically appropriate based on documented diagnosis .  3.  Pre-CERT for removal postoperative knee brace

## 2024-03-04 NOTE — H&P (VIEW-ONLY)
History of Present Illness   No chief complaint on file.  Patient today unfortunately his right knee is not doing any better.  He may get 50% relief about a month out of his a cortisone injection in October.  His repeat is mechanical symptoms simply keeps swelling and sore in the front of the knee in extension.  He is very pointed joint line tenderness.  Had a remote knee arthroscopy and is teens  Patient has notable history of meniscus repair in 1994.  Patient did fairly well however in the last year has had increasing amount of mechanical symptoms and pain.  He felt a sudden pop about 6 months ago.  Ever since that time it really been plaguing him.  He is having aching pain and soreness.  He traces it swells up.  His mechanical catching.  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      Past Medical History:   Diagnosis Date    Elevated liver enzymes 03/25/2023       Medication Documentation Review Audit       Reviewed by Kelley Juan on 10/02/23 at 1001      Medication Order Taking? Sig Documenting Provider Last Dose Status   fish oil (Omega-3) 60- mg capsule [id number] No Take 2 capsules (1,000 mg) by mouth 2 times a day. Henrry Brady, DO Taking Active   modafinil (Provigil) 200 mg tablet [id number] No Take by mouth. Historical Provider, MD Taking Active   PARoxetine (Paxil) 20 mg tablet [id number] No Take 2 tablets (40 mg) by mouth once daily. Historical Provider, MD Taking Active   venlafaxine XR (Effexor-XR) 75 mg 24 hr capsule [id number] No  Historical Provider, MD Not Taking Active                    Allergies   Allergen Reactions    Other Unknown       Social History     Socioeconomic History    Marital status:      Spouse name: Not on file    Number of children: Not on file    Years of education: Not on file    Highest education level: Not on file   Occupational History    Not on file   Tobacco Use    Smoking status: Never    Smokeless tobacco: Never   Vaping Use    Vaping  Use: Never used   Substance and Sexual Activity    Alcohol use: Not Currently    Drug use: Not Currently    Sexual activity: Not Currently   Other Topics Concern    Not on file   Social History Narrative    Not on file     Social Determinants of Health     Financial Resource Strain: Not on file   Food Insecurity: Not on file   Transportation Needs: Not on file   Physical Activity: Not on file   Stress: Not on file   Social Connections: Not on file   Intimate Partner Violence: Not on file   Housing Stability: Not on file       Past Surgical History:   Procedure Laterality Date    OTHER SURGICAL HISTORY  02/01/2021    Knee surgery    OTHER SURGICAL HISTORY  02/01/2021    Kidney surgery          Review of Systems   GENERAL: Negative for malaise, significant weight loss, fever  MUSCULOSKELETAL: See HPI  NEURO:  Negative for numbness / tingling      Physical Exam  side: right Knee:  Skin healthy and intact  No gross swelling or ecchymosis  No significant varus or valgus malalignment  Effusion: Trace to 1+  ROM: diminished range of motion flexion to 1 Tensipine in full extension  5/5 quadricep strength, no pain with internal rotation of the hip  Tenderness to palpation:  medial joint line    Negative posterior drawer test  Positive Lucas´s test, positive Apley grind  Neurovascular exam normal distally     Imaging  see dictated report from today  X-rays right knee: Patient has a well-preserved joint space.  Some slight medial compartment arthritis grade 1 with greater than 50% of joint space remaining.  MRI: Patient has no obvious meniscus tear in the posterior horn.  He does have some irregularity which could result in a partial meniscus tear given his prior knee scope.  He does have some mild arthritic changes medial compartment     Assessment:    Right knee arthritis flare with possible recurrent meniscus tear  Plan:  At this point patient is failed all conservative measures including cortisone injections, formal  physical therapy, anti-inflammatories and bracing.  I recommended right knee diagnostic arthroscopy with a possible meniscectomy or surgeries as indicated    Risks benefits and alternatives to surgery were discussed including but not limited to Infection, bleeding, neurovascular injury, pain and dysfunction, hardware related complications including cutout failure breakage, loss of function, motion, and permanent disability as well as the cardiovascular and pulmonary complications from anesthesia including death and DVT. Patient and family accept these risks.  We discussed specifically post meniscectomy pain, incomplete pain relief, meniscus retear, progressive arthritis, intraoperative decisions in regards to other pathology, and the potential for future revision surgeries including arthroplasty    Plan for outpatient surgery   1. 1 week postop follow up   2. Percocet for postop pain relief. OARRS has been reviewed and is consistent with prescribed medications. This report is scanned into the electronic medical record. The risks of abuse, dependence, addiction and diversion were considered. The medication is felt to be clinically appropriate based on documented diagnosis .  3.  Pre-CERT for removal postoperative knee brace

## 2024-03-06 ENCOUNTER — DOCUMENTATION (OUTPATIENT)
Dept: ORTHOPEDIC SURGERY | Facility: CLINIC | Age: 44
End: 2024-03-06
Payer: COMMERCIAL

## 2024-03-06 NOTE — PROGRESS NOTES
03/06/24 Dme called the patient and LVM if he have a pair of crutches for surgery or not otherwise we will schedule for the fitting.

## 2024-03-13 ENCOUNTER — TELEPHONE (OUTPATIENT)
Dept: ORTHOPEDIC SURGERY | Facility: CLINIC | Age: 44
End: 2024-03-13
Payer: COMMERCIAL

## 2024-03-14 ENCOUNTER — APPOINTMENT (OUTPATIENT)
Dept: ORTHOPEDIC SURGERY | Facility: CLINIC | Age: 44
End: 2024-03-14
Payer: COMMERCIAL

## 2024-03-20 ENCOUNTER — APPOINTMENT (OUTPATIENT)
Dept: BEHAVIORAL HEALTH | Facility: CLINIC | Age: 44
End: 2024-03-20
Payer: COMMERCIAL

## 2024-03-22 NOTE — PREPROCEDURE INSTRUCTIONS
Patient and nurse discussed pre-operative instructions of the location of procedure, NPO status, home medications, and what to aspect after procedure.  Patient is aware to not smoke nicotine products, drink alcohol, or do any drugs within 24hrs of procedure.  Not to bring any valuables and to not wear any metal, jewelry, piercing's or beauty products for surgery.   Informed about the call the business day prior to procedure that will be give the exact time of arrival on the day of surgery, between 2PM-4PM.  Any questions call the surgeons office, and any questions about Pre-Admission Testing call [phone]

## 2024-03-25 ENCOUNTER — HOSPITAL ENCOUNTER (OUTPATIENT)
Dept: CARDIOLOGY | Facility: HOSPITAL | Age: 44
Discharge: HOME | End: 2024-03-25
Payer: COMMERCIAL

## 2024-03-25 ENCOUNTER — LAB (OUTPATIENT)
Dept: LAB | Facility: HOSPITAL | Age: 44
End: 2024-03-25
Payer: COMMERCIAL

## 2024-03-25 DIAGNOSIS — S83.241A OTHER TEAR OF MEDIAL MENISCUS, CURRENT INJURY, RIGHT KNEE, INITIAL ENCOUNTER: ICD-10-CM

## 2024-03-25 DIAGNOSIS — M23.91 INTERNAL DERANGEMENT OF RIGHT KNEE: ICD-10-CM

## 2024-03-25 LAB
ALBUMIN SERPL BCP-MCNC: 4.4 G/DL (ref 3.4–5)
ALP SERPL-CCNC: 51 U/L (ref 33–120)
ALT SERPL W P-5'-P-CCNC: 54 U/L (ref 10–52)
ANION GAP SERPL CALC-SCNC: 11 MMOL/L (ref 10–20)
APTT PPP: 35 SECONDS (ref 27–38)
AST SERPL W P-5'-P-CCNC: 29 U/L (ref 9–39)
BASOPHILS # BLD AUTO: 0.02 X10*3/UL (ref 0–0.1)
BASOPHILS NFR BLD AUTO: 0.4 %
BILIRUB SERPL-MCNC: 1 MG/DL (ref 0–1.2)
BUN SERPL-MCNC: 17 MG/DL (ref 6–23)
CALCIUM SERPL-MCNC: 9 MG/DL (ref 8.6–10.3)
CHLORIDE SERPL-SCNC: 111 MMOL/L (ref 98–107)
CO2 SERPL-SCNC: 23 MMOL/L (ref 21–32)
CREAT SERPL-MCNC: 1.24 MG/DL (ref 0.5–1.3)
EGFRCR SERPLBLD CKD-EPI 2021: 73 ML/MIN/1.73M*2
EOSINOPHIL # BLD AUTO: 0.12 X10*3/UL (ref 0–0.7)
EOSINOPHIL NFR BLD AUTO: 2.4 %
ERYTHROCYTE [DISTWIDTH] IN BLOOD BY AUTOMATED COUNT: 11.6 % (ref 11.5–14.5)
GLUCOSE SERPL-MCNC: 98 MG/DL (ref 74–99)
HCT VFR BLD AUTO: 40.2 % (ref 41–52)
HGB BLD-MCNC: 13.7 G/DL (ref 13.5–17.5)
IMM GRANULOCYTES # BLD AUTO: 0.01 X10*3/UL (ref 0–0.7)
IMM GRANULOCYTES NFR BLD AUTO: 0.2 % (ref 0–0.9)
INR PPP: 0.9 (ref 0.9–1.1)
LYMPHOCYTES # BLD AUTO: 1.67 X10*3/UL (ref 1.2–4.8)
LYMPHOCYTES NFR BLD AUTO: 33.8 %
MCH RBC QN AUTO: 33.5 PG (ref 26–34)
MCHC RBC AUTO-ENTMCNC: 34.1 G/DL (ref 32–36)
MCV RBC AUTO: 98 FL (ref 80–100)
MONOCYTES # BLD AUTO: 0.34 X10*3/UL (ref 0.1–1)
MONOCYTES NFR BLD AUTO: 6.9 %
NEUTROPHILS # BLD AUTO: 2.78 X10*3/UL (ref 1.2–7.7)
NEUTROPHILS NFR BLD AUTO: 56.3 %
NRBC BLD-RTO: 0 /100 WBCS (ref 0–0)
PLATELET # BLD AUTO: 207 X10*3/UL (ref 150–450)
POTASSIUM SERPL-SCNC: 4.3 MMOL/L (ref 3.5–5.3)
PROT SERPL-MCNC: 6.6 G/DL (ref 6.4–8.2)
PROTHROMBIN TIME: 10.3 SECONDS (ref 9.8–12.8)
RBC # BLD AUTO: 4.09 X10*6/UL (ref 4.5–5.9)
SODIUM SERPL-SCNC: 141 MMOL/L (ref 136–145)
WBC # BLD AUTO: 4.9 X10*3/UL (ref 4.4–11.3)

## 2024-03-25 PROCEDURE — 36415 COLL VENOUS BLD VENIPUNCTURE: CPT

## 2024-03-25 PROCEDURE — 93005 ELECTROCARDIOGRAM TRACING: CPT

## 2024-03-25 PROCEDURE — 80053 COMPREHEN METABOLIC PANEL: CPT

## 2024-03-25 PROCEDURE — 93010 ELECTROCARDIOGRAM REPORT: CPT | Performed by: INTERNAL MEDICINE

## 2024-03-25 PROCEDURE — 85025 COMPLETE CBC W/AUTO DIFF WBC: CPT

## 2024-03-25 PROCEDURE — 85610 PROTHROMBIN TIME: CPT

## 2024-03-27 ENCOUNTER — HOSPITAL ENCOUNTER (OUTPATIENT)
Facility: HOSPITAL | Age: 44
Setting detail: OUTPATIENT SURGERY
Discharge: HOME | End: 2024-03-27
Attending: ORTHOPAEDIC SURGERY | Admitting: ORTHOPAEDIC SURGERY
Payer: COMMERCIAL

## 2024-03-27 ENCOUNTER — ANESTHESIA EVENT (OUTPATIENT)
Dept: OPERATING ROOM | Facility: HOSPITAL | Age: 44
End: 2024-03-27
Payer: COMMERCIAL

## 2024-03-27 ENCOUNTER — ANESTHESIA (OUTPATIENT)
Dept: OPERATING ROOM | Facility: HOSPITAL | Age: 44
End: 2024-03-27
Payer: COMMERCIAL

## 2024-03-27 VITALS
DIASTOLIC BLOOD PRESSURE: 72 MMHG | TEMPERATURE: 96.3 F | BODY MASS INDEX: 26.24 KG/M2 | HEART RATE: 60 BPM | RESPIRATION RATE: 16 BRPM | SYSTOLIC BLOOD PRESSURE: 110 MMHG | WEIGHT: 197.97 LBS | OXYGEN SATURATION: 98 % | HEIGHT: 73 IN

## 2024-03-27 DIAGNOSIS — S83.241A OTHER TEAR OF MEDIAL MENISCUS, CURRENT INJURY, RIGHT KNEE, INITIAL ENCOUNTER: Primary | ICD-10-CM

## 2024-03-27 PROBLEM — Z98.890 PONV (POSTOPERATIVE NAUSEA AND VOMITING): Status: ACTIVE | Noted: 2024-03-27

## 2024-03-27 PROBLEM — R11.2 PONV (POSTOPERATIVE NAUSEA AND VOMITING): Status: ACTIVE | Noted: 2024-03-27

## 2024-03-27 LAB
ATRIAL RATE: 51 BPM
P AXIS: 65 DEGREES
P OFFSET: 211 MS
P ONSET: 151 MS
PR INTERVAL: 152 MS
Q ONSET: 227 MS
QRS COUNT: 9 BEATS
QRS DURATION: 90 MS
QT INTERVAL: 422 MS
QTC CALCULATION(BAZETT): 388 MS
QTC FREDERICIA: 400 MS
R AXIS: 69 DEGREES
T AXIS: 53 DEGREES
T OFFSET: 438 MS
VENTRICULAR RATE: 51 BPM

## 2024-03-27 PROCEDURE — 2500000004 HC RX 250 GENERAL PHARMACY W/ HCPCS (ALT 636 FOR OP/ED): Performed by: ANESTHESIOLOGY

## 2024-03-27 PROCEDURE — 3700000002 HC GENERAL ANESTHESIA TIME - EACH INCREMENTAL 1 MINUTE: Performed by: ORTHOPAEDIC SURGERY

## 2024-03-27 PROCEDURE — 7100000002 HC RECOVERY ROOM TIME - EACH INCREMENTAL 1 MINUTE: Performed by: ORTHOPAEDIC SURGERY

## 2024-03-27 PROCEDURE — 3600000004 HC OR TIME - INITIAL BASE CHARGE - PROCEDURE LEVEL FOUR: Performed by: ORTHOPAEDIC SURGERY

## 2024-03-27 PROCEDURE — 2720000007 HC OR 272 NO HCPCS: Performed by: ORTHOPAEDIC SURGERY

## 2024-03-27 PROCEDURE — 2500000004 HC RX 250 GENERAL PHARMACY W/ HCPCS (ALT 636 FOR OP/ED): Performed by: ORTHOPAEDIC SURGERY

## 2024-03-27 PROCEDURE — 7100000001 HC RECOVERY ROOM TIME - INITIAL BASE CHARGE: Performed by: ORTHOPAEDIC SURGERY

## 2024-03-27 PROCEDURE — 7100000009 HC PHASE TWO TIME - INITIAL BASE CHARGE: Performed by: ORTHOPAEDIC SURGERY

## 2024-03-27 PROCEDURE — 2500000005 HC RX 250 GENERAL PHARMACY W/O HCPCS: Performed by: ORTHOPAEDIC SURGERY

## 2024-03-27 PROCEDURE — 3700000001 HC GENERAL ANESTHESIA TIME - INITIAL BASE CHARGE: Performed by: ORTHOPAEDIC SURGERY

## 2024-03-27 PROCEDURE — 29880 ARTHRS KNE SRG MNISECTMY M&L: CPT | Performed by: STUDENT IN AN ORGANIZED HEALTH CARE EDUCATION/TRAINING PROGRAM

## 2024-03-27 PROCEDURE — 7100000010 HC PHASE TWO TIME - EACH INCREMENTAL 1 MINUTE: Performed by: ORTHOPAEDIC SURGERY

## 2024-03-27 PROCEDURE — 2500000005 HC RX 250 GENERAL PHARMACY W/O HCPCS: Performed by: NURSE ANESTHETIST, CERTIFIED REGISTERED

## 2024-03-27 PROCEDURE — 29880 ARTHRS KNE SRG MNISECTMY M&L: CPT | Performed by: ORTHOPAEDIC SURGERY

## 2024-03-27 PROCEDURE — 2500000001 HC RX 250 WO HCPCS SELF ADMINISTERED DRUGS (ALT 637 FOR MEDICARE OP): Performed by: ANESTHESIOLOGY

## 2024-03-27 PROCEDURE — 2500000004 HC RX 250 GENERAL PHARMACY W/ HCPCS (ALT 636 FOR OP/ED): Performed by: NURSE ANESTHETIST, CERTIFIED REGISTERED

## 2024-03-27 PROCEDURE — 3600000009 HC OR TIME - EACH INCREMENTAL 1 MINUTE - PROCEDURE LEVEL FOUR: Performed by: ORTHOPAEDIC SURGERY

## 2024-03-27 PROCEDURE — A4217 STERILE WATER/SALINE, 500 ML: HCPCS | Performed by: ORTHOPAEDIC SURGERY

## 2024-03-27 RX ORDER — PROPOFOL 10 MG/ML
INJECTION, EMULSION INTRAVENOUS CONTINUOUS PRN
Status: DISCONTINUED | OUTPATIENT
Start: 2024-03-27 | End: 2024-03-27

## 2024-03-27 RX ORDER — MIDAZOLAM HYDROCHLORIDE 1 MG/ML
INJECTION, SOLUTION INTRAMUSCULAR; INTRAVENOUS AS NEEDED
Status: DISCONTINUED | OUTPATIENT
Start: 2024-03-27 | End: 2024-03-27

## 2024-03-27 RX ORDER — SODIUM CHLORIDE, SODIUM LACTATE, POTASSIUM CHLORIDE, CALCIUM CHLORIDE 600; 310; 30; 20 MG/100ML; MG/100ML; MG/100ML; MG/100ML
100 INJECTION, SOLUTION INTRAVENOUS CONTINUOUS
Status: DISCONTINUED | OUTPATIENT
Start: 2024-03-27 | End: 2024-03-27 | Stop reason: HOSPADM

## 2024-03-27 RX ORDER — LIDOCAINE HYDROCHLORIDE 10 MG/ML
0.1 INJECTION, SOLUTION EPIDURAL; INFILTRATION; INTRACAUDAL; PERINEURAL ONCE
Status: DISCONTINUED | OUTPATIENT
Start: 2024-03-27 | End: 2024-03-27 | Stop reason: HOSPADM

## 2024-03-27 RX ORDER — BUPIVACAINE HCL/EPINEPHRINE 0.25-.0005
VIAL (ML) INJECTION AS NEEDED
Status: DISCONTINUED | OUTPATIENT
Start: 2024-03-27 | End: 2024-03-27 | Stop reason: HOSPADM

## 2024-03-27 RX ORDER — PROPOFOL 10 MG/ML
INJECTION, EMULSION INTRAVENOUS AS NEEDED
Status: DISCONTINUED | OUTPATIENT
Start: 2024-03-27 | End: 2024-03-27

## 2024-03-27 RX ORDER — ERGOCALCIFEROL 1.25 MG/1
1.25 CAPSULE ORAL
COMMUNITY

## 2024-03-27 RX ORDER — LIDOCAINE HYDROCHLORIDE 20 MG/ML
INJECTION, SOLUTION EPIDURAL; INFILTRATION; INTRACAUDAL; PERINEURAL AS NEEDED
Status: DISCONTINUED | OUTPATIENT
Start: 2024-03-27 | End: 2024-03-27

## 2024-03-27 RX ORDER — FENTANYL CITRATE 50 UG/ML
INJECTION, SOLUTION INTRAMUSCULAR; INTRAVENOUS AS NEEDED
Status: DISCONTINUED | OUTPATIENT
Start: 2024-03-27 | End: 2024-03-27

## 2024-03-27 RX ORDER — KETOROLAC TROMETHAMINE 30 MG/ML
INJECTION, SOLUTION INTRAMUSCULAR; INTRAVENOUS AS NEEDED
Status: DISCONTINUED | OUTPATIENT
Start: 2024-03-27 | End: 2024-03-27

## 2024-03-27 RX ORDER — OXYCODONE AND ACETAMINOPHEN 5; 325 MG/1; MG/1
1 TABLET ORAL EVERY 6 HOURS PRN
Qty: 28 TABLET | Refills: 0 | Status: SHIPPED | OUTPATIENT
Start: 2024-03-27 | End: 2024-04-03

## 2024-03-27 RX ORDER — ONDANSETRON HYDROCHLORIDE 2 MG/ML
4 INJECTION, SOLUTION INTRAVENOUS ONCE AS NEEDED
Status: DISCONTINUED | OUTPATIENT
Start: 2024-03-27 | End: 2024-03-27 | Stop reason: HOSPADM

## 2024-03-27 RX ORDER — SODIUM CHLORIDE 9 MG/ML
100 INJECTION, SOLUTION INTRAVENOUS CONTINUOUS
Status: DISCONTINUED | OUTPATIENT
Start: 2024-03-27 | End: 2024-03-27 | Stop reason: HOSPADM

## 2024-03-27 RX ORDER — ONDANSETRON HYDROCHLORIDE 2 MG/ML
INJECTION, SOLUTION INTRAVENOUS AS NEEDED
Status: DISCONTINUED | OUTPATIENT
Start: 2024-03-27 | End: 2024-03-27

## 2024-03-27 RX ORDER — OXYCODONE HYDROCHLORIDE 5 MG/1
5 TABLET ORAL EVERY 4 HOURS PRN
Status: DISCONTINUED | OUTPATIENT
Start: 2024-03-27 | End: 2024-03-27 | Stop reason: HOSPADM

## 2024-03-27 RX ORDER — CEFAZOLIN SODIUM 2 G/100ML
2 INJECTION, SOLUTION INTRAVENOUS ONCE
Status: COMPLETED | OUTPATIENT
Start: 2024-03-27 | End: 2024-03-27

## 2024-03-27 RX ORDER — SCOLOPAMINE TRANSDERMAL SYSTEM 1 MG/1
1 PATCH, EXTENDED RELEASE TRANSDERMAL ONCE
Status: COMPLETED | OUTPATIENT
Start: 2024-03-27 | End: 2024-03-27

## 2024-03-27 RX ORDER — FENTANYL CITRATE 50 UG/ML
50 INJECTION, SOLUTION INTRAMUSCULAR; INTRAVENOUS EVERY 5 MIN PRN
Status: DISCONTINUED | OUTPATIENT
Start: 2024-03-27 | End: 2024-03-27 | Stop reason: HOSPADM

## 2024-03-27 RX ORDER — FENTANYL CITRATE 50 UG/ML
25 INJECTION, SOLUTION INTRAMUSCULAR; INTRAVENOUS EVERY 5 MIN PRN
Status: DISCONTINUED | OUTPATIENT
Start: 2024-03-27 | End: 2024-03-27 | Stop reason: HOSPADM

## 2024-03-27 RX ORDER — MEPERIDINE HYDROCHLORIDE 25 MG/ML
12.5 INJECTION INTRAMUSCULAR; INTRAVENOUS; SUBCUTANEOUS EVERY 10 MIN PRN
Status: DISCONTINUED | OUTPATIENT
Start: 2024-03-27 | End: 2024-03-27 | Stop reason: HOSPADM

## 2024-03-27 RX ORDER — SODIUM CHLORIDE 0.9 G/100ML
IRRIGANT IRRIGATION AS NEEDED
Status: DISCONTINUED | OUTPATIENT
Start: 2024-03-27 | End: 2024-03-27 | Stop reason: HOSPADM

## 2024-03-27 RX ADMIN — FENTANYL CITRATE 25 MCG: 50 INJECTION, SOLUTION INTRAMUSCULAR; INTRAVENOUS at 08:35

## 2024-03-27 RX ADMIN — FENTANYL CITRATE 50 MCG: 50 INJECTION, SOLUTION INTRAMUSCULAR; INTRAVENOUS at 07:49

## 2024-03-27 RX ADMIN — SODIUM CHLORIDE 100 ML/HR: 9 INJECTION, SOLUTION INTRAVENOUS at 06:54

## 2024-03-27 RX ADMIN — LIDOCAINE HYDROCHLORIDE 100 MG: 20 INJECTION, SOLUTION EPIDURAL; INFILTRATION; INTRACAUDAL; PERINEURAL at 07:36

## 2024-03-27 RX ADMIN — SCOPOLAMINE 1 PATCH: 1 SYSTEM TRANSDERMAL at 07:31

## 2024-03-27 RX ADMIN — PROPOFOL 50 MG: 10 INJECTION, EMULSION INTRAVENOUS at 08:00

## 2024-03-27 RX ADMIN — PROPOFOL 100 MG: 10 INJECTION, EMULSION INTRAVENOUS at 07:37

## 2024-03-27 RX ADMIN — OXYCODONE HYDROCHLORIDE 5 MG: 5 TABLET ORAL at 09:13

## 2024-03-27 RX ADMIN — DEXAMETHASONE SODIUM PHOSPHATE 8 MG: 4 INJECTION, SOLUTION INTRAMUSCULAR; INTRAVENOUS at 07:46

## 2024-03-27 RX ADMIN — FENTANYL CITRATE 50 MCG: 50 INJECTION, SOLUTION INTRAMUSCULAR; INTRAVENOUS at 07:36

## 2024-03-27 RX ADMIN — KETOROLAC TROMETHAMINE 30 MG: 30 INJECTION, SOLUTION INTRAMUSCULAR at 08:08

## 2024-03-27 RX ADMIN — CEFAZOLIN SODIUM 2 G: 2 INJECTION, SOLUTION INTRAVENOUS at 07:30

## 2024-03-27 RX ADMIN — ONDANSETRON 4 MG: 2 INJECTION, SOLUTION INTRAMUSCULAR; INTRAVENOUS at 08:08

## 2024-03-27 RX ADMIN — PROPOFOL 200 MCG/KG/MIN: 10 INJECTION, EMULSION INTRAVENOUS at 07:37

## 2024-03-27 RX ADMIN — MIDAZOLAM 2 MG: 1 INJECTION INTRAMUSCULAR; INTRAVENOUS at 07:29

## 2024-03-27 RX ADMIN — PROPOFOL 50 MG: 10 INJECTION, EMULSION INTRAVENOUS at 07:52

## 2024-03-27 RX ADMIN — PROPOFOL 200 MG: 10 INJECTION, EMULSION INTRAVENOUS at 07:36

## 2024-03-27 RX ADMIN — PROPOFOL 50 MG: 10 INJECTION, EMULSION INTRAVENOUS at 07:51

## 2024-03-27 SDOH — HEALTH STABILITY: MENTAL HEALTH: CURRENT SMOKER: 0

## 2024-03-27 ASSESSMENT — PAIN DESCRIPTION - LOCATION: LOCATION: KNEE

## 2024-03-27 ASSESSMENT — PAIN - FUNCTIONAL ASSESSMENT
PAIN_FUNCTIONAL_ASSESSMENT: 0-10

## 2024-03-27 ASSESSMENT — COLUMBIA-SUICIDE SEVERITY RATING SCALE - C-SSRS
6. HAVE YOU EVER DONE ANYTHING, STARTED TO DO ANYTHING, OR PREPARED TO DO ANYTHING TO END YOUR LIFE?: NO
1. IN THE PAST MONTH, HAVE YOU WISHED YOU WERE DEAD OR WISHED YOU COULD GO TO SLEEP AND NOT WAKE UP?: NO
2. HAVE YOU ACTUALLY HAD ANY THOUGHTS OF KILLING YOURSELF?: NO

## 2024-03-27 ASSESSMENT — PAIN DESCRIPTION - ORIENTATION: ORIENTATION: RIGHT

## 2024-03-27 ASSESSMENT — PAIN SCALES - GENERAL
PAINLEVEL_OUTOF10: 5 - MODERATE PAIN
PAINLEVEL_OUTOF10: 5 - MODERATE PAIN
PAIN_LEVEL: 0
PAINLEVEL_OUTOF10: 5 - MODERATE PAIN
PAINLEVEL_OUTOF10: 0 - NO PAIN
PAINLEVEL_OUTOF10: 3

## 2024-03-27 ASSESSMENT — PAIN DESCRIPTION - DESCRIPTORS
DESCRIPTORS: ACHING

## 2024-03-27 NOTE — ANESTHESIA PREPROCEDURE EVALUATION
Patient: Rafael Mar    Procedure Information       Date/Time: 03/27/24 0730    Procedure: ARTHROSCOPY KNEE MEDIAL MENISCECTOMY (Right: Knee)    Location: [facility]    Surgeons: Mehul Corrales MD            Relevant Problems   Anesthesia   (+) PONV (postoperative nausea and vomiting)      Cardiac   (+) Mixed hyperlipidemia      Neuro   (+) Posttraumatic stress disorder       Clinical information reviewed:   Tobacco  Allergies  Meds   Med Hx  Surg Hx   Fam Hx  Soc Hx        NPO Detail:  NPO/Void Status  Carbohydrate Drink Given Prior to Surgery? : N  Date of Last Liquid: 03/26/24  Time of Last Liquid: 1800  Date of Last Solid: 03/26/24  Time of Last Solid: 1800  Last Intake Type: Clear fluids; Food  Time of Last Void: 0400         Physical Exam    Airway  Mallampati: II  TM distance: >3 FB  Neck ROM: full     Cardiovascular   Rhythm: regular     Dental - normal exam     Pulmonary   Breath sounds clear to auscultation     Abdominal          Anesthesia Plan    History of general anesthesia?: yes  History of complications of general anesthesia?: yes    ASA 1     general   (LMA  PONV prophylaxis, scop patch)  The patient is not a current smoker.  Education provided regarding risk of obstructive sleep apnea.  Anesthetic plan and risks discussed with patient.    Plan discussed with CRNA.

## 2024-03-27 NOTE — DISCHARGE INSTRUCTIONS
General Anesthesia Discharge Instructions    About this topic  You may need general anesthesia if you need to be asleep during a procedure. Your doctor will use drugs to block the signals that go from your nerves to your brain. Doctors give general anesthesia during a surgery or procedure to:  Allow you to sleep  Help your body be still  Relax your muscles  Help you to relax and be pain free  Keep you from remembering the surgery  Let the doctor manage your airway, breathing, and blood flow  The doctor or nurse anesthetist gives general anesthesia by a shot into your vein. Sometimes, you may breathe in a gas through a mask placed over your face.  What care is needed at home?  Ask your doctor what you need to do when you go home. Make sure you ask questions if you do not understand what the doctor says.  Your doctor may give you drugs to prevent or treat an upset stomach from the anesthetic. Take them as ordered.  If your throat is sore, suck on ice chips or popsicles to ease throat pain.  Put 2 to 3 pillows under your head and back when you lie down to help you breathe easier.  For the first 24 to 48 hours:  Do not operate heavy or dangerous machinery.  Do not make major decisions or sign important papers. You may not be able to think clearly.  Avoid beer, wine, or mixed drinks.  You are at a higher risk of falling for at least 24 hours after general anesthesia.  Take extra care when you get up.  Do not change positions quickly.  Do not rush when you need to go to the bathroom or to answer the phone.  Ask for help if you feel unsteady when you try to walk.  Wear shoes with non-slip soles and low heels.  What follow-up care is needed?  Your doctor may ask you to come back to the office to check on your progress. Be sure to keep these visits.  If you have stitches that do not dissolve or staples, you will need to have them removed. Your doctor will want to do this in 1 to 2 weeks. If the doctor used skin glue, the  glue will fall off on its own.  What drugs may be needed?  The doctor may order drugs to:  Help with pain  Treat an upset stomach or throwing up  Will physical activity be limited?  You will not be allowed to drive right away after the procedure. Ask a family member or a friend to drive you home.  Avoid trying to get out of bed without help until you are sure of your balance.  You may have to limit your activity. Talk to your doctor about if you need to limit how much you lift or limit exercise after your procedure.  What changes to diet are needed?  Start with a light diet when you are fully awake. This includes things that are easy to swallow like soups, pudding, jello, toast, and eggs. Slowly progress to your normal diet.  What problems could happen?  Low blood pressure  Breathing problems  Upset stomach or throwing up  Dizziness  Blood clots  Infection  When do I need to call the doctor?  Trouble breathing  Upset stomach or throwing up more than 3 times in the next 2 days  Dizziness  Teach Back: Helping You Understand  The Teach Back Method helps you understand the information we are giving you. After you talk with the staff, tell them in your own words what you learned. This helps to make sure the staff has described each thing clearly. It also helps to explain things that may have been confusing. Before going home, make sure you can do these:  I can tell you about my procedure.  I can tell you if I need to follow up with my doctor.  I can tell you what is good for me to eat and drink the next day.  I can tell you what I would do if I have trouble breathing, an upset stomach, or dizziness.  Where can I learn more?  [employer] [place] [employer]  https://www.nigms.nih.gov/education/pages/factsheet_Anesthesia.aspx  NHS Choices  http://www.nhs.uk/conditions/Anaesthetic-general/Pages/Definition.aspx  Last Reviewed Date  2020-04-22

## 2024-03-27 NOTE — OP NOTE
ARTHROSCOPY KNEE MEDIAL MENISCECTOMY (R) Operative Note     Date: 3/27/2024  OR Location: [facility]    Name: Rafael Mar, : 1980, Age: 43 y.o., MRN: [id number], Sex: male    Diagnosis  Pre-op Diagnosis     * Other tear of medial meniscus, current injury, right knee, initial encounter [S83.241A] Post-op Diagnosis     * Other tear of medial meniscus, current injury, right knee, initial encounter [S83.006A]     Procedures  ARTHROSCOPY KNEE MEDIAL MENISCECTOMY  75347 - SC ARTHRS KNE SURG W/MENISCECTOMY MED/LAT W/SHVG  Right knee arthroscopy with partial medial and lateral meniscectomy medial 15%/lateral 10%)    Surgeons      * Mehul Corrales - Primary    Resident/Fellow/Other Assistant:  Surgeon(s) and Role: Marie Yoon    Procedure Summary  Anesthesia: General  ASA: I  Anesthesia Staff: Anesthesiologist: Haritha Camacho MD  CRNA: FRANCES Jimeenz-CRNA  Estimated Blood Loss: Minimal mL  Intra-op Medications:   Administrations occurring from 0730 to 0815 on 24:   Medication Name Total Dose   sodium chloride 0.9 % irrigation solution 6,000 mL   BUPivacaine-EPINEPHrine (Marcaine w/EPI) 0.25 %-1:200,000 injection 30 mL   scopolamine (Transderm-Scop) patch 1 patch 1 patch              Anesthesia Record               Intraprocedure I/O Totals          Intake    Propofol Drip 0.00 mL    The total shown is the total volume documented since Anesthesia Start was filed.    Total Intake 0 mL          Specimen: No specimens collected     Staff:   Circulator: Lisa Melton RN  Scrub Person: Lily Dawn         Drains and/or Catheters: * None in log *  Indications: 43-year-old male with persistent mechanical symptoms elected proceed with a right knee arthroscopy with meniscectomy and indicated surgeries    Risks benefits and alternatives to surgery were discussed including but not limited to Infection, bleeding, neurovascular injury, pain and dysfunction, hardware related complications including cutout  failure breakage, loss of function, motion, and permanent disability as well as the cardiovascular and pulmonary complications from anesthesia including death and DVT. Patient and family accept these risks.  We discussed specifically post meniscectomy pain, incomplete pain relief, meniscus retear, progressive arthritis, intraoperative decisions in regards to other pathology, and the potential for future revision surgeries including arthroplasty    Patient identified in the preop area.  Right lower extremity marked and confirmed with patient as the operative site.  Brought back to the operating room and anesthetized under general anesthesia.  Operative lower extremity was then prepped and draped in standard sterile fashion.  Patient, site and procedure were confirmed with timeout.  Everyone in the room agreed.  Preop antibiotics were given.    We then made standard medial and lateral arthroscopy portal and the scope was introduced.  Medial joint space : Patient had a small flap tear posterior horn medial meniscus.  Frayed edges which were freely mobile.  Resect about 15 % of the meniscus back to stable base and predominate from the undersurface portion.  Patient did have a diffuse cartilage change grade 3 over the medial femoral condyle over a 1.5-2 cm area.  No chondroplasty was required  ACL and notch were normal  Lateral joint space: Small inner edge tear at the mid body of the meniscus.  Partial meniscectomy about the 10% was resected.  Color cartilage on the lateral side  Patellofemoral joint space: Patient had some minor softening in the patellar cartilage laterally as well as a small area on the lateral trochlea.  Pretty extensive suprapatellar plica.  Circumferentially excised this from medial to lateral with increase her working space improved the patellofemoral mechanics to offload this area of cartilage disease in the lateral trochlea    Wounds were irrigated with normal saline.  Portals were closed with  standard fashion.  Sterile dressings were applied.  Patient was then awoken from general anesthesia and sent to the PACU in stable condition.    Marie Yoon PA-C was present throughout the entire case. Given the nature of the disease process and the procedure, a skilled surgical first assistant was necessary during the case. The assistant was necessary to hold retractors and to manipulate the extremity during the procedure. A certified scrub tech was at the back table managing the instruments and supplies for the surgical case.  Mehul Corrales  Phone Number: [phone]

## 2024-03-27 NOTE — ANESTHESIA POSTPROCEDURE EVALUATION
Patient: Rafael Mar    Procedure Summary       Date: 03/27/24 Room / Location: [facility]    Anesthesia Start: 0730 Anesthesia Stop: 0821    Procedure: ARTHROSCOPY KNEE MEDIAL MENISCECTOMY (Right: Knee) Diagnosis:       Other tear of medial meniscus, current injury, right knee, initial encounter      (Other tear of medial meniscus, current injury, right knee, initial encounter [S83.067A])    Surgeons: Mehul Corrales MD Responsible Provider: Haritha Camacho MD    Anesthesia Type: general ASA Status: 1            Anesthesia Type: general (TIVA)    Vitals Value Taken Time   /73 03/27/24 0818   Temp 36.2 03/27/24 0821   Pulse 68 03/27/24 0820   Resp 18 03/27/24 0820   SpO2 98 % 03/27/24 0820   Vitals shown include unvalidated device data.    Anesthesia Post Evaluation    Patient location during evaluation: bedside  Patient participation: waiting for patient participation  Level of consciousness: sleepy but conscious  Pain score: 0  Pain management: adequate  Airway patency: patent  Cardiovascular status: acceptable and stable  Respiratory status: acceptable, face mask and oral airway  Hydration status: stable  Postoperative Nausea and Vomiting: none        No notable events documented.

## 2024-03-27 NOTE — NURSING NOTE
Pt has a scopalamine patch behind left ear. Intact. Precautions reviewed with patient with patch. Pt verbalized understanding

## 2024-03-27 NOTE — ANESTHESIA PROCEDURE NOTES
Airway  Date/Time: 3/27/2024 7:39 AM  Urgency: elective    Airway not difficult    Staffing  Performed: CRNA   Authorized by: Haritha Camacho MD    Performed by: FRANCES Jimenez-CRNA  Patient location during procedure: OR    Indications and Patient Condition  Indications for airway management: anesthesia  Spontaneous Ventilation: absent  Sedation level: deep  Preoxygenated: yes  Patient position: sniffing  MILS maintained throughout  Mask difficulty assessment: 0 - not attempted  Planned trial extubation    Final Airway Details  Final airway type: supraglottic airway      Successful airway: classic  Size 5     Number of attempts at approach: 1    Additional Comments  atraumatic

## 2024-04-05 ENCOUNTER — OFFICE VISIT (OUTPATIENT)
Dept: ORTHOPEDIC SURGERY | Facility: CLINIC | Age: 44
End: 2024-04-05
Payer: COMMERCIAL

## 2024-04-05 DIAGNOSIS — S83.241A OTHER TEAR OF MEDIAL MENISCUS, CURRENT INJURY, RIGHT KNEE, INITIAL ENCOUNTER: Primary | ICD-10-CM

## 2024-04-05 PROCEDURE — 99024 POSTOP FOLLOW-UP VISIT: CPT | Performed by: ORTHOPAEDIC SURGERY

## 2024-04-05 NOTE — PROGRESS NOTES
History of Present Illness  Patient returns today noting minimal pain.  Denies any calf pain or shortness of breath.     Exam  Mild effusion  Healthy incisions - no active drainage  Good range of motion  No calf swelling  Negative Daquan´s test  Distal neurovascular exam intact     Assessment  Patient status post right knee arthroscopy partial medial meniscectomy      Plan  Reviewed arthroscopic photos and findings.  Discussed short and long term implications for the knee.  Discussed analgesics, ice, rest.  Encouraged home exercise program, physical therapy.   Follow-up 4 weeks

## 2024-05-02 NOTE — PROGRESS NOTES
History of Present Illness:  DOS: 03/27/24 Right knee arthroscopy medial meniscectomy. Still has some pain around incision. He works night shift and uses ibuprofen regularly.        Exam:  Mild effusion  Healed incisions   Good range of motion  No calf swelling  Negative Daquan´s test  Distal neurovascular exam intact  Flex to 120  Mild pain with patellar grind  Tenderness at medial portal     Assessment:  Patient status post right knee arthroscopy partial medial meniscectomy      Plan:  Reviewed arthroscopic photos and findings.  Discussed short and long term implications for the knee.  Discussed analgesics, ice, rest.  Formal physical therapy  Follow-up 6 weeks         Scribe Attestation  By signing my name below, IDaija Scribe   attest that this documentation has been prepared under the direction and in the presence of Mehul Corrales MD.

## 2024-05-03 ENCOUNTER — OFFICE VISIT (OUTPATIENT)
Dept: ORTHOPEDIC SURGERY | Facility: CLINIC | Age: 44
End: 2024-05-03
Payer: COMMERCIAL

## 2024-05-03 DIAGNOSIS — S83.241A TEAR OF MEDIAL MENISCUS OF RIGHT KNEE, CURRENT, UNSPECIFIED TEAR TYPE, INITIAL ENCOUNTER: ICD-10-CM

## 2024-05-03 PROCEDURE — 1036F TOBACCO NON-USER: CPT | Performed by: ORTHOPAEDIC SURGERY

## 2024-05-03 PROCEDURE — 99024 POSTOP FOLLOW-UP VISIT: CPT | Performed by: ORTHOPAEDIC SURGERY

## 2024-05-13 NOTE — PROGRESS NOTES
"Patient Name: Rafael Mar  MRN: [id number]  Today's Date: 5/13/2024       Referring Diagnosis:  No diagnosis found.    Insurance:  1/  [employer] // 0% coins, no ded, $6000 / $44720 oop ($1078.86 / $4882.97 met), $40 copay, 75v per devorah yr (0v used as of 5/13/2024), no PA per [employer](ref# [id number])   POC: 5.16.24 - 8.14.24    Precautions:       Subjective:  Referred by: Dr. Corrales, S83.241A (ICD-10-CM) - Tear of medial meniscus of right knee, current, unspecified tear type s/p [facility] 3.27.24  Initial Injury ***  Referral date ***    Pain:      Location: ***   Description: ***   Aggravating Factors: {Aggravating Factors:36577}   Relieving Factors:  {Relieving Factors:74456}    Diagnostics ***  Meds ***  Previous Treatments ***  PMHx-  Reviewed with patient and chart   Home environment ***  Occupation ***  Hobbies ***    Functional limitations   Walking ***  Standing ***  Sitting ***    Handed ***    Patients goal: ***    Objective:  (p! indicates pain)  Posture ***    Gait/Stairs ***  Bed Mobility ***  Transfers ***  Sit to Stand 18\" high 5 x times in *** sec    AROM  KNEE  Extension R/L ***  Flexion R/L ***  HIP  Flexion R/L  ***  IR R/L ***  ER R/L ***  AB R/L ***    Myotomes/Strength: #/5  L2: Hip flexion R/L:  ***  L3: Knee extension R/L:  ***  L4: Ankle dorsiflexion, inversion R/L:  ***  L5: Great toe extension R/L: ***  S1: Ankle plantarflexion and eversion R/L: ***  S2: Knee flexion R/L:  ***  Hip ABD R/L:  ***  Hip ext R/L:  ***  Hip IR R/L ***  Hip ER R/L ***    Palpation ***    Special Tests ***    Joint mobility ***    Flexibility  Hamstring 90/90 position R/L:  ***  Rony test R/L:  ***    Dermatomes LE ***    Circumference cm  mid patella ***    SLS - flat level surface  R/L *** sec  Outcome Measure:  {PT Outcome Measures:09750}     Treatment:  PT low complexity eval:   Ther ex/patient ed/HEP instruction   The patient was educated on: the importance of positioning, proper posture, and body " mechanics, joint mechanics and pathology, general tissue healing time, the appropriate use of heat and cold to control pain and inflammation, the importance of general therapeutic exercise, especially to stay within pain-free ROM, specific anatomy, function, & regional interdependence of involved areas, & likely cause of impairments & POC. Pt's questions were answered to their satisfaction, & pt. verbalized understanding & agreement with POC.  HEP      Assessment:  *** clinical presentation includes characteristics as noted during today's evaluation consistent with needing skilled physical therapy for ***.  Patient presents with deficits including a decrease in postural awareness, strength, ROM and an increase in STR which is increasing patient's pain and limiting their ability to return to PLOF. Clinical findings indicate *** requiring the need for skilled PT services.   These findings indicate that this patient is of low complexity, and skilled PT services are warranted in order to realize measurable and meaningful change in the above outcome measures and achieve improvements in the patient's functional status and individual goals.     Plan:     Planned Interventions include: therapeutic exercise, self-care home management, manual therapy, therapeutic activities, gait training, neuromuscular coordination, vasopneumatic, dry needling, aquatic therapy      Goals:  Goals: To be achieved in ***    Patient will verbalize a decrease in pain *** in order to ***    Patient will improve flexibility, joint mobility, and AROM in *** to ***    Patient will increase strength of  *** to allow the patient to perform ***    Patient to improve balance to be able to SLS *** to decrease risk for falls    Patient will improve *** outcome measure score to  *** to demonstrate an overall improvement in function    Patient will improve sit to stand functional test to complete 5 reps at 18 in < 12 sec without UE support    Patient will  improve ambulation to ***    Patient will correctly perform HEP without cues to maintain progress and overall functional status and patient will be independent with strategies designed to manage symptoms     Patient's LTG -  ***    The patient and/or caregiver was involved in the creation of PT goals and patient agrees with prognosis, goals, and need to actively participate in the POC.

## 2024-05-16 ENCOUNTER — APPOINTMENT (OUTPATIENT)
Dept: PHYSICAL THERAPY | Facility: CLINIC | Age: 44
End: 2024-05-16
Payer: COMMERCIAL

## 2024-05-23 ENCOUNTER — APPOINTMENT (OUTPATIENT)
Dept: PHYSICAL THERAPY | Facility: CLINIC | Age: 44
End: 2024-05-23
Payer: COMMERCIAL

## 2024-05-31 ENCOUNTER — APPOINTMENT (OUTPATIENT)
Dept: PHYSICAL THERAPY | Facility: CLINIC | Age: 44
End: 2024-05-31
Payer: COMMERCIAL

## 2024-06-04 NOTE — PROGRESS NOTES
Patient Name: Rafael Mar  MRN: [id number]  Today's Date: 6/7/2024  Time Calculation  Start Time: 0915  Stop Time: 0959  Time Calculation (min): 44 min    Referring Diagnosis:  1. Weakness of right lower extremity  Follow Up In Physical Therapy      2. Tear of medial meniscus of right knee, current, unspecified tear type, initial encounter  Referral to Physical Therapy    Follow Up In Physical Therapy      3. Abnormal increased muscle tightness  Follow Up In Physical Therapy          Insurance:  1/10  [place] [employer] // 0% coins, no ded, $6000 / $77968 oop ($1078.86 / $4882.97 met), $40 copay, 75v per devorah yr (0v used as of 5/13/2024), no PA per [employer](ref# [id number])   POC: 6.7.24 - 9.5.24    Precautions:  Precautions  Precautions Comment: low fall risk    Subjective:  Referred by:  Dr. Corrales  Initial Injury DOS: 03/27/24 Right knee arthroscopy medial meniscectomy   Referral date 5.3.24, S83.241A (ICD-10-CM) - Tear of medial meniscus of right knee, current, unspecified tear type, initial encounter   Reported long history of R knee pain and has had surgery on R knee in the past.  Today he is reporting ongoing R knee pain since the surgery  especially with prolonged standing, WB. and working.   Pain:  Pain Assessment: 0-10  Pain Score:  (2-6)  Pain Type: Surgical pain  Pain Location: Knee  Pain Orientation: Right   Location: R knee med and lateral joint line    Description: sharp pain infrapatella - states it pops when he extends knee from a flexed position which increases pain.    Aggravating Factors: pushing wheelchairs, lifting    Relieving Factors:  ice    Diagnostics MRI and xrays   Meds NSAID as needed  Previous Treatments none  PMHx-  Reviewed with patient and chart   Home environment has stairs - able to complete with reciprocal gait however has pain.   Occupation FT ER nurse   Hobbies runner, jogging, working out    Functional limitations   Reports that WB activities of standing, walking, stairs are  "bothersome and cause discomfort but does not limit him.  He works through the pain.   Sitting minimal limits.     Patients goal: Have better ROM, less edema, less pain     Objective:  (p! indicates pain)  Posture unremarkable    Gait/Stairs no deviations noted  Bed Mobility indep  Transfers indep  Sit to Stand 18\" high 5 x times in 15.2 sec without UE support with pain lateral aspect - good B knee positioning    AROM  KNEE  Extension R/L 3, 0   Flexion R/L 135, 142  HIP WFL    Myotomes/Strength: #/5  L2: Hip flexion R/L:  5, 5  L3: Knee extension R/L:  5, 5  L4: Ankle dorsiflexion, inversion R/L:  NT  S1: Ankle plantarflexion and eversion R/L: in standing 5, 5   S2: Knee flexion R/L:  5, 5  Hip ABD R/L:  4+, 5  Hip ADD R/L  4, NT    Palpation mild TTP along joint line and along infrapatellar and suprapatellar pole.   Ballotable patella     Joint mobility WFL    Flexibility  Hamstring  R/L:  mod/severe restrictions    Well healed arthroscopic portal holes present,     Circumference cm  mid patella   41.3,  39.6    SLS - flat level surface  R/L at least 15  sec each   Outcome Measure:  Other Measures  5x Sit to Stand: 15.2  Lower Extremity Funtional Score (LEFS): 59     Treatment:  PT low complexity eval: 20/1  Ther ex/patient ed/HEP instruction 95839: 24/2  The patient was educated on: the importance of positioning, proper posture, and body mechanics, joint mechanics and pathology, general tissue healing time, the appropriate use of heat and cold to control pain and inflammation, the importance of general therapeutic exercise, especially to stay within pain-free ROM, specific anatomy, function, & regional interdependence of involved areas, & likely cause of impairments & POC. Pt's questions were answered to their satisfaction, & pt. verbalized understanding & agreement with POC.  HEP    Access Code: [email or web address]  URL: [email or web address]  Date: 06/07/2024  Prepared by: Lorrie Orona  - " Supine Quadricep Sets  - 2 x daily - 7 x weekly - 2 sets - 10 reps - 3 sec hold  - Supine Heel Slide  - 2 x daily - 7 x weekly - 2 sets - 10 reps - 3 sec hold  - Active Straight Leg Raise with Quad Set  - 2 x daily - 7 x weekly - 2 sets - 10 reps - 3 sec hold  - Sidelying Hip Abduction  - 2 x daily - 7 x weekly - 2 sets - 10 reps - 3 sec hold  - Sidelying Hip Adduction  - 2 x daily - 7 x weekly - 2 sets - 10 reps - 3 sec hold  - Standing Hamstring Stretch with Step  - 2 x daily - 7 x weekly - 1 sets - 2 reps - 20 sec hold  - Short Arc Quad with Ball Squeeze  - 2 x daily - 7 x weekly - 2 sets - 10 reps - 3 sec hold    Assessment:  Mr. Mar' clinical presentation includes characteristics as noted during today's evaluation consistent with needing skilled physical therapy for Tear of medial meniscus of right knee s/p [facility] 3.27.24.  Patient presents with deficits including a decrease in postural awareness, strength, ROM and an increase in STR which is increasing patient's pain and limiting their ability to return to PLOF. Clinical findings indicate mild R knee edema, hamstring tightness and quad weakness which may be responsible for poor patellar tracking (resulting in popping) requiring the need for skilled PT services.   These findings indicate that this patient is of low complexity, and skilled PT services are warranted in order to realize measurable and meaningful change in the above outcome measures and achieve improvements in the patient's functional status and individual goals.     Plan:  PT Plan: Skilled PT  PT Frequency: 1 time per week  Duration: 5  Onset Date: 03/27/24  Certification Period Start Date: 06/07/24  Certification Period End Date: 09/05/24  Number of Treatments Authorized: 75  Rehab Potential: Good  Plan of Care Agreement: Patient  Planned Interventions include: therapeutic exercise, self-care home management, manual therapy, therapeutic activities, gait training, neuromuscular coordination,  vasopneumatic, dry needling, aquatic therapy      Goals:  Goals: To be achieved in 10 visits     Patient will verbalize a decrease in pain R knee to 0/10 in order to work a shift without pain at the end    Patient will improve flexibility, joint mobility, and AROM in B hamstrings by patient report to improve knee pain and patellar alignment    Patient will increase strength of  R LE to 5/5 allow the patient to improve patellar tracking and eliminate pain with stair climbing and 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     Patient will correctly perform HEP without cues to maintain progress and overall functional status and patient will be independent with strategies designed to manage symptoms     Patient's LTG -  Patients goal: Have better ROM, less edema, less pain     The patient and/or caregiver was involved in the creation of PT goals and patient agrees with prognosis, goals, and need to actively participate in the POC.

## 2024-06-07 ENCOUNTER — EVALUATION (OUTPATIENT)
Dept: PHYSICAL THERAPY | Facility: CLINIC | Age: 44
End: 2024-06-07
Payer: COMMERCIAL

## 2024-06-07 DIAGNOSIS — M62.89 ABNORMAL INCREASED MUSCLE TIGHTNESS: ICD-10-CM

## 2024-06-07 DIAGNOSIS — R29.898 WEAKNESS OF RIGHT LOWER EXTREMITY: Primary | ICD-10-CM

## 2024-06-07 DIAGNOSIS — S83.241A TEAR OF MEDIAL MENISCUS OF RIGHT KNEE, CURRENT, UNSPECIFIED TEAR TYPE, INITIAL ENCOUNTER: ICD-10-CM

## 2024-06-07 PROCEDURE — 97161 PT EVAL LOW COMPLEX 20 MIN: CPT | Mod: GP | Performed by: PHYSICAL THERAPIST

## 2024-06-07 PROCEDURE — 97110 THERAPEUTIC EXERCISES: CPT | Mod: GP | Performed by: PHYSICAL THERAPIST

## 2024-06-07 ASSESSMENT — PAIN - FUNCTIONAL ASSESSMENT: PAIN_FUNCTIONAL_ASSESSMENT: 0-10

## 2024-06-08 NOTE — PROGRESS NOTES
Patient Name: Rafael Mar  MRN: [id number]  Today's Date: 6/8/2024        Diagnosis:  No diagnosis found.    Insurance:  2/10  [employer] [employer] // 0% coins, no ded, $6000 / $68577 oop ($1078.86 / $4882.97 met), $40 copay, 75v per devorah yr (0v used as of 5/13/2024), no PA per [employer](ref# [id number])   POC: 6.7.24 - 9.5.24    Precautions:       Subjective:  ***    Pain:      Location: ***   Description: ***   Aggravating Factors: {Aggravating Factors:33599}   Relieving Factors:  {Relieving Factors:19778}    Objective:  ***    Outcome Measure:  {PT Outcome Measures:68773}     Treatment:  There ex 50896:  Nustep  Stair HS stretch  QS  HS  SLR  SL hip ab  SL hip ad  SAQ with ball    Assessment:  Patient tolerated today's session ***  Patient cont to be challenged by ***  requiring the need for additional skilled PT services in order to achieve the patient's and PT goal.  Patient demonstrates a working understanding of principals instructed and home program.    Plan:  Progress with functional strength as tolerated with respect to tissue healing. Manual therapy PRN to improve/maintain ROM, prevent adhesion, reduce pain.    HEP/Education:  ***

## 2024-06-13 ENCOUNTER — APPOINTMENT (OUTPATIENT)
Dept: PHYSICAL THERAPY | Facility: CLINIC | Age: 44
End: 2024-06-13
Payer: COMMERCIAL

## 2024-06-14 ENCOUNTER — APPOINTMENT (OUTPATIENT)
Dept: ORTHOPEDIC SURGERY | Facility: CLINIC | Age: 44
End: 2024-06-14
Payer: COMMERCIAL

## 2024-06-18 NOTE — PROGRESS NOTES
Patient Name: Rafael Mar  MRN: [id number]  Today's Date: 6/21/2024  Time Calculation  Start Time: 0827  Stop Time: 0859  Time Calculation (min): 32 min     Diagnosis:  1. Tear of medial meniscus of right knee, current, unspecified tear type, initial encounter  Follow Up In Physical Therapy      2. Weakness of right lower extremity  Follow Up In Physical Therapy      3. Abnormal increased muscle tightness  Follow Up In Physical Therapy          Insurance:  2/10  [place] [employer] // 0% coins, no ded, $6000 / $08771 oop ($1078.86 / $4882.97 met), $40 copay, 75v per devorah yr (0v used as of 5/13/2024), no PA per [employer](ref# [id number])   POC: 6.7.24 - 9.5.24    Precautions:  Precautions  Precautions Comment: no falls    Subjective:  Patient reports intermittent compliance with HEP but states knee is starting to feel a little better.  Pain ranges 0-4/10.  States just worked 5 days in a row and his knee is sore.  Patient requested shortened treatment time due to increased generalized fatigue from work.     Pain:  Pain Assessment: 0-10  0-10 (Numeric) Pain Score: 4  Pain Type: Surgical pain  Pain Location: Knee  Pain Orientation: Right      Objective:  R hip add approx 3+ to 4-    Outcome Measure:  NT     Treatment:  There ex 88612: 32/2  Nustep L1 x 5 min  Stair HS stretch 2 x 20  QS x 5  HS x 5  SLR x 5  SL hip ab x 5  SL hip ad x 5  SAQ with ball x 5  ADDED  Clamshell x 10  Bridge with hip add ball x 10  Bridge with hip abd GTB x 10  Seated hip IR with ball GTB x 10  Lateral stepping GTB x 10     Assessment:  Patient tolerated today's session well.  He demonstrated good knowledge of previously issued HEP.  He was able to advance his HEP but was challenged with the addition of the new ex.    Patient cont to be challenged by LE weakness especially hip add add abd ER strength as well as pain with prolonged activity  requiring the need for additional skilled PT services in order to achieve the patient's and PT goal. Patient  want to be able to return to jogging which will also require additional PT services.   Patient demonstrates a working understanding of principals instructed and home program.    Plan:  Progress with functional strength as tolerated with respect to tissue healing. Manual therapy PRN to improve/maintain ROM, prevent adhesion, reduce pain.    HEP/Education:    Access Code: [email or web address]  URL: [email or web address]  Date: 06/21/2024  Prepared by: Lorrie Sterling    Exercises  - Clamshell  - 1 x daily - 4 x weekly - 1 sets - 10 reps  - Supine Bridge with Mini Swiss Ball Between Knees  - 1 x daily - 4 x weekly - 1 sets - 10 reps  - Bridge with Hip Abduction and Resistance - Ground Touches  - 1 x daily - 4 x weekly - 1 sets - 10 reps  - Seated Hip Internal Rotation with Ball and Resistance  - 1 x daily - 4 x weekly - 1 sets - 10 reps  - Side Stepping with Resistance at Ankles  - 1 x daily - 4 x weekly - 1 sets - 10 reps

## 2024-06-21 ENCOUNTER — TREATMENT (OUTPATIENT)
Dept: PHYSICAL THERAPY | Facility: CLINIC | Age: 44
End: 2024-06-21
Payer: COMMERCIAL

## 2024-06-21 DIAGNOSIS — S83.241A TEAR OF MEDIAL MENISCUS OF RIGHT KNEE, CURRENT, UNSPECIFIED TEAR TYPE, INITIAL ENCOUNTER: ICD-10-CM

## 2024-06-21 DIAGNOSIS — R29.898 WEAKNESS OF RIGHT LOWER EXTREMITY: ICD-10-CM

## 2024-06-21 DIAGNOSIS — M62.89 ABNORMAL INCREASED MUSCLE TIGHTNESS: ICD-10-CM

## 2024-06-21 PROCEDURE — 97110 THERAPEUTIC EXERCISES: CPT | Mod: GP | Performed by: PHYSICAL THERAPIST

## 2024-06-21 ASSESSMENT — PAIN SCALES - GENERAL: PAINLEVEL_OUTOF10: 4

## 2024-06-21 ASSESSMENT — PAIN - FUNCTIONAL ASSESSMENT: PAIN_FUNCTIONAL_ASSESSMENT: 0-10

## 2024-06-30 NOTE — PROGRESS NOTES
History of Present Illness:  DOS: 24 Right knee arthroscopy medial meniscectomy (medial 15%/lateral 10%). Patient had a small flap tear posterior horn medial meniscus. Small inner edge tear at the mid body of the meniscus. He is doing well. He is doing formal physical therapy.     Exam:  Mild effusion  Healed incisions   No calf swelling  Negative Daquan´s test  Distal neurovascular exam intact  Flex to 120  5/5 quad strength  Tenderness at medial portal     Assessment:  Patient status post right knee arthroscopy partial medial meniscectomy      Plan:  Reviewed arthroscopic photos and findings.  Continue formal physical therapy  Follow-up as needed         Scribe Attestation  By signing my name below, IDaija, Scribe   attest that this documentation has been prepared under the direction and in the presence of Mehul Corrales MD.     Past Medical History:   Diagnosis Date    COVID-19 2023    Elevated liver enzymes 2023    PONV (postoperative nausea and vomiting)     PTSD (post-traumatic stress disorder)        Medication Documentation Review Audit       Reviewed by Viv Vega MA (Medical Assistant) on 24 at 0957      Medication Order Taking? Sig Documenting Provider Last Dose Status   diazePAM (Valium) 5 mg tablet [id number]  Take 1 tablet (5 mg) by mouth 1 time for 1 dose. Roxy Oneil MD   24 235   ergocalciferol (Vitamin D-2) 1.25 MG (84232 UT) capsule [id number] No Take 1 capsule (1,250 mcg) by mouth 1 (one) time per week. Historical Provider, MD Past Week Active   methylPREDNISolone (Medrol Dospak) 4 mg tablets [id number]  TAKE PER HUI INSTRUCTIONS Historical Provider, MD  Active   PARoxetine CR (Paxil CR) 25 mg 24 hr tablet [id number] No Take 2 tablets (50 mg) by mouth once daily. Do not crush, chew, or split. Roxy Oneil MD 3/26/2024  24 235   topiramate (Topamax) 50 mg tablet [id number] No Take 1 tablet (50 mg) by mouth 2 times a day. Roxy BEE  MD Thad 3/26/2024  24 2700                    No Known Allergies    Social History     Socioeconomic History    Marital status:      Spouse name: Not on file    Number of children: Not on file    Years of education: Not on file    Highest education level: Not on file   Occupational History    Not on file   Tobacco Use    Smoking status: Never    Smokeless tobacco: Never   Vaping Use    Vaping status: Never Used   Substance and Sexual Activity    Alcohol use: Not Currently    Drug use: Not Currently    Sexual activity: Not Currently   Other Topics Concern    Not on file   Social History Narrative    Not on file     Social Determinants of Health     Financial Resource Strain: Not on file   Food Insecurity: Not on file   Transportation Needs: Not on file   Physical Activity: Not on file   Stress: Not on file   Social Connections: Not on file   Intimate Partner Violence: Not on file   Housing Stability: Not on file       Past Surgical History:   Procedure Laterality Date    KIDNEY SURGERY      reflex valve as child    MENISCECTOMY Right 2021

## 2024-07-01 ENCOUNTER — OFFICE VISIT (OUTPATIENT)
Dept: ORTHOPEDIC SURGERY | Facility: CLINIC | Age: 44
End: 2024-07-01
Payer: COMMERCIAL

## 2024-07-01 DIAGNOSIS — Z98.890 S/P LEFT KNEE ARTHROSCOPY: Primary | ICD-10-CM

## 2024-07-01 PROCEDURE — 99213 OFFICE O/P EST LOW 20 MIN: CPT | Performed by: ORTHOPAEDIC SURGERY

## 2024-07-01 PROCEDURE — 1036F TOBACCO NON-USER: CPT | Performed by: ORTHOPAEDIC SURGERY

## 2024-07-08 PROBLEM — F32.A DEPRESSIVE DISORDER: Status: ACTIVE | Noted: 2024-07-08

## 2024-07-08 PROBLEM — U07.1 DISEASE DUE TO SEVERE ACUTE RESPIRATORY SYNDROME CORONAVIRUS 2 (SARS-COV-2): Status: ACTIVE | Noted: 2024-07-08

## 2024-07-08 PROBLEM — G47.26 CIRCADIAN RHYTHM SLEEP DISORDER, SHIFT WORK TYPE: Status: ACTIVE | Noted: 2024-07-08

## 2024-07-08 PROBLEM — R74.8 ELEVATED LIVER ENZYMES: Status: ACTIVE | Noted: 2024-07-08

## 2024-07-08 PROBLEM — E55.9 VITAMIN D DEFICIENCY: Status: ACTIVE | Noted: 2024-07-08

## 2024-07-08 PROBLEM — E78.5 HYPERLIPIDEMIA: Status: ACTIVE | Noted: 2024-07-08

## 2024-07-08 RX ORDER — MODAFINIL 200 MG/1
TABLET ORAL
COMMUNITY
Start: 2023-08-23 | End: 2024-07-09

## 2024-07-08 RX ORDER — CHOLECALCIFEROL (VITAMIN D3) 1250 MCG
1 TABLET ORAL
COMMUNITY
Start: 2024-03-13

## 2024-07-09 ENCOUNTER — APPOINTMENT (OUTPATIENT)
Dept: BEHAVIORAL HEALTH | Facility: CLINIC | Age: 44
End: 2024-07-09
Payer: COMMERCIAL

## 2024-07-09 DIAGNOSIS — F43.10 POSTTRAUMATIC STRESS DISORDER: ICD-10-CM

## 2024-07-09 PROCEDURE — 99213 OFFICE O/P EST LOW 20 MIN: CPT | Performed by: STUDENT IN AN ORGANIZED HEALTH CARE EDUCATION/TRAINING PROGRAM

## 2024-07-09 RX ORDER — PAROXETINE HYDROCHLORIDE HEMIHYDRATE 25 MG/1
50 TABLET, FILM COATED, EXTENDED RELEASE ORAL DAILY
Qty: 60 TABLET | Refills: 2 | Status: SHIPPED | OUTPATIENT
Start: 2024-07-09 | End: 2024-10-07

## 2024-07-09 RX ORDER — TOPIRAMATE 50 MG/1
75 TABLET, FILM COATED ORAL 2 TIMES DAILY
Qty: 90 TABLET | Refills: 2 | Status: SHIPPED | OUTPATIENT
Start: 2024-07-09 | End: 2024-10-07

## 2024-07-09 NOTE — PATIENT INSTRUCTIONS
Increase topiramate as discussed.    Please send me a message in [employer] if things aren't going as expected or you are unsure about something we discussed. If this isn't working, you can call the psychiatry department line at [phone], or leave me a voicemail at [phone].  If you are having thoughts of harming yourself or ending your life, please call the national suicide hotline 24/7 at 437. For this and other mental health emergencies, such as losing touch with reality, call the [facility] 24/7 mobile crisis hotline at 974-409-2761, or dial 911 for emergency services.

## 2024-07-09 NOTE — ASSESSMENT & PLAN NOTE
Agoraphobia now relegated to small public spaces, like coffee shops, that only have one exit. Having anxiety at work less than before. He's content to live with that, but open to medication changes. Only intervention has been to increase topiramate, so we will continue with that approach.  - increase topiramate 50 mg BID --> 75 mg BID  - continue Paxil CR 50 mg daily

## 2024-07-09 NOTE — PROGRESS NOTES
Outpatient Psychiatry Follow-Up    Virtual or Telephone Consent  An interactive audio and video telecommunication system which permits real time communications between the patient (at the originating site) and provider (at the distant site) was utilized to provide this telehealth service.   Verbal consent was requested and obtained from Rafael Mar on this date, 07/09/24 for a telehealth visit.     Rafael Mar is a 43 y.o. male presenting for psychiatric follow-up.     Subjective   Previous Treatment Plan         ICD-10-CM    Posttraumatic stress disorder F43.10     Valium for son's birthday party tonight  Get a data point for agoraphobia now that meds are at effective duration  Can increase topiramate once not having nausea         Relevant Medications    diazePAM (Valium) 5 mg tablet    PARoxetine CR (Paxil CR) 25 mg 24 hr tablet    topiramate (Topamax) 50 mg tablet    Other Relevant Orders    Follow Up In Psychiatry    Shift work sleep disorder G47.26    Relevant Orders    Follow Up In Psychiatry     Interim History  Recently had R knee arthroscopy for meniscal tear; doing PT, which is helping a lot.    Agoraphobia: Doing better overall. Can't go into small coffee shop type places with only one door. Okay with [employer] and other places.  His 20m daughter had to be admitted to [facility] twice but doing better now and sleeping better.  Nausea/topiramate: has resolved, but feeling pretty overall and not interested in making any changes  Mood has been up and down, sometimes having anxiety attacks at work. Still, much better than before.       Objective   Mental Status Exam:  General Appearance: Well groomed, appropriate eye contact  Attitude/Behavior: Cooperative  Motor: No psychomotor agitation or retardation, no tremor or other abnormal movements  Speech: Normal rate, volume, prosody  Mood: fine  Affect: Euthymic, full-range  Thought Process: Linear, goal directed  Thought Associations: No loosening of  associations  Thought Content: Normal  Perception: No perceptual abnormalities noted  Sensorium: Alert  Insight: Intact  Judgement: Intact  Cognition: Cognitively intact to conversational testing with respect to attention, orientation, fund of knowledge, recent and remote memory, and language    Lab Review:   No visits with results within 2 Month(s) from this visit.   Latest known visit with results is:   Hospital Outpatient Visit on 03/25/2024   Component Date Value    Ventricular Rate 03/25/2024 51     Atrial Rate 03/25/2024 51     HI Interval 03/25/2024 152     QRS Duration 03/25/2024 90     QT Interval 03/25/2024 422     QTC Calculation(Bazett) 03/25/2024 388     P Axis 03/25/2024 65     R Axis 03/25/2024 69     T [place] 03/25/2024 53     QRS Count 03/25/2024 9     Q Onset 03/25/2024 227     P Onset 03/25/2024 151     P Offset 03/25/2024 211     T Offset 03/25/2024 438     QTC Fredericia 03/25/2024 400           Assessment/Plan   Problem List Items Addressed This Visit             ICD-10-CM    Posttraumatic stress disorder F43.10     Agoraphobia now relegated to small public spaces, like coffee shops, that only have one exit. Having anxiety at work less than before. He's content to live with that, but open to medication changes. Only intervention has been to increase topiramate, so we will continue with that approach.  - increase topiramate 50 mg BID --> 75 mg BID  - continue Paxil CR 50 mg daily         Relevant Medications    PARoxetine CR (Paxil CR) 25 mg 24 hr tablet    topiramate (Topamax) 50 mg tablet    Other Relevant Orders    Follow Up In Psychiatry          Next appointment with me in 2 month(s).    Suicide Risk Assessment  There are no new risk factors for suicide and imminent risk remains low; therefore, Rafael Grills does not require further risk mitigation.    I provided the mobile crisis number and encouraged calling this, [phone] or 911 in case of a mental health crisis, including feeling suicidal or  losing touch with reality.    Roxy Oneil MD

## 2024-09-11 ENCOUNTER — APPOINTMENT (OUTPATIENT)
Dept: BEHAVIORAL HEALTH | Facility: CLINIC | Age: 44
End: 2024-09-11
Payer: COMMERCIAL

## 2024-09-11 DIAGNOSIS — F43.10 POSTTRAUMATIC STRESS DISORDER: ICD-10-CM

## 2024-09-11 PROCEDURE — 99215 OFFICE O/P EST HI 40 MIN: CPT | Performed by: STUDENT IN AN ORGANIZED HEALTH CARE EDUCATION/TRAINING PROGRAM

## 2024-09-11 RX ORDER — TOPIRAMATE 50 MG/1
50 TABLET, FILM COATED ORAL 2 TIMES DAILY
Qty: 60 TABLET | Refills: 2 | Status: SHIPPED | OUTPATIENT
Start: 2024-09-11 | End: 2024-12-10

## 2024-09-11 RX ORDER — PAROXETINE HYDROCHLORIDE HEMIHYDRATE 25 MG/1
50 TABLET, FILM COATED, EXTENDED RELEASE ORAL DAILY
Qty: 60 TABLET | Refills: 2 | Status: SHIPPED | OUTPATIENT
Start: 2024-09-11 | End: 2024-12-10

## 2024-09-11 NOTE — PROGRESS NOTES
Outpatient Psychiatry Follow-Up    Virtual or Telephone Consent  An interactive audio and video telecommunication system which permits real time communications between the patient (at the originating site) and provider (at the distant site) was utilized to provide this telehealth service.   Verbal consent was requested and obtained from Rafael Mar on this date, 09/12/24 for a telehealth visit.     Rafael Mar is a 43 y.o. male presenting for psychiatric follow-up.     Subjective   Previous Treatment Plan         ICD-10-CM    Posttraumatic stress disorder F43.10     Agoraphobia now relegated to small public spaces, like coffee shops, that only have one exit. Having anxiety at work less than before. He's content to live with that, but open to medication changes. Only intervention has been to increase topiramate, so we will continue with that approach.  - increase topiramate 50 mg BID --> 75 mg BID  - continue Paxil CR 50 mg daily         Relevant Medications    PARoxetine CR (Paxil CR) 25 mg 24 hr tablet    topiramate (Topamax) 50 mg tablet    Other Relevant Orders    Follow Up In Psychiatry     Interim History  /sleep: No change   Anxiety at work: having a lot more episodes at work. 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 He will go in the med room and try to calm down. Affecting his performance at work - taking a lot longer to do his job. Brain isn't clear.   Agoraphobia: No change. Couldn't go to a coffee shop in [place] recently. 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   Topiramate: not helping at higher dose  Mood: feeling more depressed, no SI. Feeling hopeless about his situation mentally.     He is interested in therapy but can't do it until after the holidays.    It's also hard because he can't exercise how used to - surgeon not helpful.   He needs to exercise more - could try the bike with his daughter, who will be 2  soon.       Objective   Mental Status Exam:  General Appearance: Well groomed, appropriate eye contact  Attitude/Behavior: Cooperative  Motor: No psychomotor agitation or retardation, no tremor or other abnormal movements  Speech: Normal rate, volume, prosody  Mood: kind of depressed  Affect: Dysphoric, constricted but reactive  Thought Process: Linear, goal directed  Thought Associations: No loosening of associations  Thought Content: Normal  Perception: No perceptual abnormalities noted  Sensorium: Alert  Insight: Intact  Judgement: Intact  Cognition: Cognitively intact to conversational testing with respect to attention, orientation, fund of knowledge, recent and remote memory, and language    Lab Review:   not applicable       Assessment/Plan   Problem List Items Addressed This Visit             ICD-10-CM    Posttraumatic stress disorder F43.10     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 Frustrated with medications having limited effects. Willing to try therapy once he can make time (1/2025).  - reduce topiramate back from 75 mg BID --> 50 mg BID (nausea)  - continue Paxil CR 50 mg daily  - provided grounding exercises for when overwhelmed at work  - resume exercising by cycling  - referral to trauma exposure therapy to start in 1/2025         Relevant Medications    PARoxetine CR (Paxil CR) 25 mg 24 hr tablet    topiramate (Topamax) 50 mg tablet    Other Relevant Orders    Follow Up In Psychiatry    Referral to Psychology          Next appointment with me in 3 month(s).    Suicide Risk Assessment  There are no new risk factors for suicide and imminent risk remains low; therefore, Rafael Mar does not require further risk mitigation.    I provided the mobile crisis number and encouraged calling this, [phone] or 911 in case of a mental health crisis, including feeling suicidal or losing touch with reality.    Roxy Oenil MD

## 2024-09-11 NOTE — ASSESSMENT & PLAN NOTE
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 Frustrated with medications having limited effects. Willing to try therapy once he can make time (1/2025).  - reduce topiramate back from 75 mg BID --> 50 mg BID (nausea)  - continue Paxil CR 50 mg daily  - provided grounding exercises for when overwhelmed at work  - resume exercising by cycling  - referral to trauma exposure therapy to start in 1/2025

## 2024-09-11 NOTE — PATIENT INSTRUCTIONS
Grounding Exercises  [place]:  [email or web address]  [id number] Exercise:  [email or web address]     Yosef fax for paperwork: [phone]    Please send me a message in [employer] if things aren't going as expected or you are unsure about something we discussed. If this isn't working, you can call the psychiatry department line at [phone], or leave me a voicemail at [phone].  If you are having thoughts of harming yourself or ending your life, please call the national suicide hotline 24/7 at 477. For this and other mental health emergencies, such as losing touch with reality, call the [facility] 24/7 mobile crisis hotline at 536-092-5922, or dial 911 for emergency services.

## 2024-12-06 ENCOUNTER — APPOINTMENT (OUTPATIENT)
Dept: PRIMARY CARE | Facility: CLINIC | Age: 44
End: 2024-12-06
Payer: COMMERCIAL

## 2024-12-11 ENCOUNTER — APPOINTMENT (OUTPATIENT)
Dept: BEHAVIORAL HEALTH | Facility: CLINIC | Age: 44
End: 2024-12-11
Payer: COMMERCIAL

## 2025-01-03 ENCOUNTER — APPOINTMENT (OUTPATIENT)
Dept: PRIMARY CARE | Facility: CLINIC | Age: 45
End: 2025-01-03
Payer: COMMERCIAL

## 2025-04-01 ENCOUNTER — APPOINTMENT (OUTPATIENT)
Dept: BEHAVIORAL HEALTH | Facility: CLINIC | Age: 45
End: 2025-04-01
Payer: COMMERCIAL

## 2025-04-01 ENCOUNTER — TELEMEDICINE (OUTPATIENT)
Dept: BEHAVIORAL HEALTH | Facility: CLINIC | Age: 45
End: 2025-04-01
Payer: COMMERCIAL

## 2025-04-01 DIAGNOSIS — F43.10 POSTTRAUMATIC STRESS DISORDER: ICD-10-CM

## 2025-04-01 PROCEDURE — 99214 OFFICE O/P EST MOD 30 MIN: CPT | Performed by: STUDENT IN AN ORGANIZED HEALTH CARE EDUCATION/TRAINING PROGRAM

## 2025-04-01 RX ORDER — BENZONATATE 200 MG/1
CAPSULE ORAL
COMMUNITY
Start: 2025-01-07

## 2025-04-01 NOTE — ASSESSMENT & PLAN NOTE
Limited efficacy of medication, difficulty making consistent time for therapy. 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 Has already stopped medications, which weren't helpful long-term.  - formally stop topiramate and paroxetine  - meet with general counselor and/or [facility] Anxiety linn  - help facilitate job change

## 2025-04-01 NOTE — PROGRESS NOTES
"Outpatient Psychiatry Follow-Up    Virtual or Telephone Consent  An interactive audio and video telecommunication system which permits real time communications between the patient (at the originating site) and provider (at the distant site) was utilized to provide this telehealth service.   Verbal consent was requested and obtained from Rafael Mar on this date, 04/01/25 for a telehealth visit and the patient's location was confirmed at the time of the visit.    Rafael Mar is a 44 y.o. male presenting for psychiatric follow-up.     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 Frustrated with medications having limited effects. Willing to try therapy once he can make time (1/2025).  - reduce topiramate back from 75 mg BID --> 50 mg BID (nausea)  - continue Paxil CR 50 mg daily  - provided grounding exercises for when overwhelmed at work  - resume exercising by cycling  - referral to trauma exposure therapy to start in 1/2025         Relevant Medications    PARoxetine CR (Paxil CR) 25 mg 24 hr tablet    topiramate (Topamax) 50 mg tablet    Other Relevant Orders    Follow Up In Psychiatry    Referral to Psychology     Interim History  Exposure therapy has not started and he appears to have run out of medications a few months ago.    Meds:   Therapy:   Exercise:          Objective   Mental Status Exam:       OARRS:  No data recorded  {OARRSReview:56144}  Is the patient prescribed a combination of a benzodiazepine and opioid?  {Opioid/Benzo:55509}  Last Urine Drug Screen / ordered today: {YES (DEF)/NO:10530}  No results found for this or any previous visit (from the past 8760 hours).  {ResultsAsExpected:94012}    Lab Review:   {Recent labs:19471::\"not applicable\"}       Assessment/Plan   Problem List Items Addressed This Visit    None         {Follow " Up:79597}    Suicide Risk Assessment  {TDSILIST:40394}    Roxy Oneil MD

## 2025-04-01 NOTE — PROGRESS NOTES
Outpatient Psychiatry Follow-Up    Virtual or Telephone Consent  An interactive audio and video telecommunication system which permits real time communications between the patient (at the originating site) and provider (at the distant site) was utilized to provide this telehealth service.   Verbal consent was requested and obtained from Rafael Mar on this date, 04/01/25 for a telehealth visit and the patient's location was confirmed at the time of the visit.    Rafael Mar is a 44 y.o. male presenting for psychiatric follow-up.     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 Frustrated with medications having limited effects. Willing to try therapy once he can make time (1/2025).  - reduce topiramate back from 75 mg BID --> 50 mg BID (nausea)  - continue Paxil CR 50 mg daily  - provided grounding exercises for when overwhelmed at work  - resume exercising by cycling  - referral to trauma exposure therapy to start in 1/2025         Relevant Medications    PARoxetine CR (Paxil CR) 25 mg 24 hr tablet    topiramate (Topamax) 50 mg tablet    Other Relevant Orders    Follow Up In Psychiatry    Referral to Psychology     Interim History  Exposure therapy has not started and he appears to have run out of medications a few months ago.    Frustrated with family situation, especially his wife, their parenting style is different. His daughter is sleeping much more consistently but also very active during the day. 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  Still having difficulty with concentration at work.  Not having panic attacks to where he has to leave work, but is having to leave the floor for 20-30 minutes at a time to calm down. Using his  grounding and relaxation techniques. Will turn the lights off and observe his breathing.    Meds: He stopped them and didn't notice much difference other than having more emotion, which he was okay with. No change in performance at work or at home.  Therapy: has number for a general counselor, just needs to call but has been overwhelmed with stressors.  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 Hopes to swim more during the summer.       Objective   Mental Status Exam:  General Appearance: Well groomed, appropriate eye contact  Attitude/Behavior: Cooperative  Motor: No psychomotor agitation or retardation, no tremor or other abnormal movements  Speech: Normal rate, volume, prosody  Mood: getting by  Affect: Euthymic, full-range  Thought Process: Linear, goal directed  Thought Associations: No loosening of associations  Thought Content: Normal  Perception: No perceptual abnormalities noted  Sensorium: Alert  Insight: Intact  Judgement: Intact  Cognition: Cognitively intact to conversational testing with respect to attention, orientation, fund of knowledge, recent and remote memory, and language    Lab Review:   not applicable       Assessment/Plan   Problem List Items Addressed This Visit             ICD-10-CM    Posttraumatic stress disorder F43.10     Limited efficacy of medication, difficulty making consistent time for therapy. 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 Has already stopped medications, which weren't helpful long-term.  - formally stop topiramate and paroxetine  - meet with general counselor and/or Unwinding Anxiety linn  - help facilitate job change         Relevant Orders    Follow Up In Psychiatry        Next appointment with me in 6 week(s).    Suicide Risk Assessment  There are no new risk factors for suicide and imminent risk remains low; therefore, Rafael Mar does not  require further risk mitigation.    I provided the mobile crisis number and encouraged calling this, 988 or 911 in case of a mental health crisis, including feeling suicidal or losing touch with reality.    Roxy Oneil MD

## 2025-04-01 NOTE — PATIENT INSTRUCTIONS
Unwinding Anxiety ilnn:  [email or web address]    Please send me a message in [employer] if things aren't going as expected or you are unsure about something we discussed. If this isn't working, you can call the psychiatry department line at [phone], or leave me a voicemail at [phone].  If you are having thoughts of harming yourself or ending your life, please call the national suicide hotline 24/7 at 941. For this and other mental health emergencies, such as losing touch with reality, call the [facility] 24/7 mobile crisis hotline at 278-067-0722, or dial 911 for emergency services.

## 2025-05-13 PROBLEM — U07.1 COVID-19: Status: ACTIVE | Noted: 2025-05-13

## 2025-05-13 PROBLEM — S62.632A: Status: ACTIVE | Noted: 2025-05-13

## 2025-05-14 ENCOUNTER — APPOINTMENT (OUTPATIENT)
Dept: BEHAVIORAL HEALTH | Facility: CLINIC | Age: 45
End: 2025-05-14
Payer: COMMERCIAL

## 2025-05-14 DIAGNOSIS — G47.26 SHIFT WORK SLEEP DISORDER: ICD-10-CM

## 2025-05-14 DIAGNOSIS — F43.10 POSTTRAUMATIC STRESS DISORDER: ICD-10-CM

## 2025-05-14 PROCEDURE — 99214 OFFICE O/P EST MOD 30 MIN: CPT | Performed by: STUDENT IN AN ORGANIZED HEALTH CARE EDUCATION/TRAINING PROGRAM

## 2025-05-14 NOTE — PATIENT INSTRUCTIONS
Please send me a message in [employer] if things aren't going as expected or you are unsure about something we discussed. If this isn't working, you can call the psychiatry department line at [phone], or leave me a voicemail at [phone].  If you are having thoughts of harming yourself or ending your life, please call the national suicide hotline 24/7 at 169. For this and other mental health emergencies, such as losing touch with reality, call the [facility] 24/7 mobile crisis hotline at 481-931-3391, or dial 911 for emergency services.

## 2025-05-14 NOTE — PROGRESS NOTES
Outpatient Psychiatry Follow-Up    Virtual or Telephone Consent  An interactive audio and video telecommunication system which permits real time communications between the patient (at the originating site) and provider (at the distant site) was utilized to provide this telehealth service.   Verbal consent was requested and obtained from Rafael Mar on this date, 05/14/25 for a telehealth visit and the patient's location was confirmed at the time of the visit.    Rafael Mar is a 44 y.o. male presenting for psychiatric follow-up.     Subjective   Previous Treatment Plan         ICD-10-CM    Posttraumatic stress disorder F43.10     Limited efficacy of medication, difficulty making consistent time for therapy. 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 Has already stopped medications, which weren't helpful long-term.  - formally stop topiramate and paroxetine  - meet with general counselor and/or Unwinding Anxiety linn  - help facilitate job change         Relevant Orders    Follow Up In Psychiatry     Interim History    Anxiety/mood: Anxiety is worse; he doesn't get much sleep because he's caring for his daughter and doing housework and cooking meals, sleep cycle is more disrupted, lower emotional reserve at work.  Job change: not going to happen anytime soon. Waiting to hear back from HR about FMLA paperwork.   He's [employer], goes to [employer], feels better when he goes, volunteers with the kids.  Counselor vs linn: hasn't done either  Hasn't felt so depressed in his life: Denies SI, daughter protective       Objective   Mental Status Exam:  General Appearance: Well groomed, appropriate eye contact  Attitude/Behavior: Cooperative  Motor: No psychomotor agitation or retardation, no tremor or other abnormal movements  Speech: Normal rate, volume, prosody  Mood: it's going - kind of  Affect: Euthymic, full-range, Angry  Thought Process: Linear, goal directed  Thought  Associations: No loosening of associations  Thought Content: Normal  Perception: No perceptual abnormalities noted  Sensorium: Alert  Insight: Intact  Judgement: Intact  Cognition: Cognitively intact to conversational testing with respect to attention, orientation, fund of knowledge, recent and remote memory, and language    Lab Review:   not applicable       Assessment/Plan   Problem List Items Addressed This Visit           ICD-10-CM    Posttraumatic stress disorder F43.10    Limited efficacy of medication, difficulty making consistent time for therapy. Paroxetine and Topiramate were only marginally helpful. Social stress is playing a major role. Didn't do well with trauma therapy, more interested in general support.  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 Modafinil had been helpful initially, worth trying again as long as he is currently unable to change jobs.  - UDS - once complete, restart Modafinil daily PRN working             Next appointment with me in 1 month(s).    Suicide Risk Assessment  There are no new risk factors for suicide and imminent risk remains low; therefore, Rafael Mar does not require further risk mitigation.    I provided the mobile crisis number and encouraged calling this, 988 or 911 in case of a mental health crisis, including feeling suicidal or losing touch with reality.    Roxy Oneil MD

## 2025-05-14 NOTE — ASSESSMENT & PLAN NOTE
Limited efficacy of medication, difficulty making consistent time for therapy. Paroxetine and Topiramate were only marginally helpful. Social stress is playing a major role. Didn't do well with trauma therapy, more interested in general support.  - plan to try going back to Confucianism, which was helpful in the past

## 2025-05-14 NOTE — ASSESSMENT & PLAN NOTE
More difficulty at work related to less reliable sleep schedule. Modafinil had been helpful initially, worth trying again as long as he is currently unable to change jobs.  - UDS - once complete, restart Modafinil daily PRN working

## 2025-05-17 LAB
AMPHETAMINES UR QL: NEGATIVE NG/ML
BARBITURATES UR QL: NEGATIVE NG/ML
BENZODIAZ UR QL: NEGATIVE NG/ML
BZE UR QL: NEGATIVE NG/ML
CREAT UR-MCNC: 84.2 MG/DL
FENTANYL UR QL SCN: NEGATIVE NG/ML
METHADONE UR QL: NEGATIVE NG/ML
OPIATES UR QL: NEGATIVE NG/ML
OXIDANTS UR QL: NEGATIVE MCG/ML
OXYCODONE UR QL: NEGATIVE NG/ML
PCP UR QL: NEGATIVE NG/ML
PH UR: 8.9 [PH] (ref 4.5–9)
QUEST NOTES AND COMMENTS: NORMAL
THC UR QL: NEGATIVE NG/ML

## 2025-05-19 DIAGNOSIS — G47.26 CIRCADIAN RHYTHM SLEEP DISORDER, SHIFT WORK TYPE: ICD-10-CM

## 2025-05-19 RX ORDER — MODAFINIL 200 MG/1
TABLET ORAL
Qty: 60 TABLET | Refills: 0 | Status: SHIPPED | OUTPATIENT
Start: 2025-05-19

## 2025-05-21 ENCOUNTER — TELEPHONE (OUTPATIENT)
Dept: BEHAVIORAL HEALTH | Facility: CLINIC | Age: 45
End: 2025-05-21
Payer: COMMERCIAL

## 2025-05-21 NOTE — PROGRESS NOTES
PROVIDER: butch  MEDICATION/DOSAGE: modafinil (ProvigiL) 200 mg tablet   QTY/SUPPLY: 60/30  PRIOR AUTH COMPLETED VIA: epic  INSURANCE:   MARLENA VARGAS - G1K BB CDH-N [id number] ("[employer])  Covered: Retail, Mail Order, Specialty Unknown: Long-Term Care                Member ID: [id number] BIN: [id number]  : 1980   Group ID: [id number] PCN: [id number]  Legal sex: M   [facility]  Address: [address] [place] [address]            Prior authorization approved  Payer: [employer] FEP Case ID: [id number]    [phone]  Note from payer: Your PA request has been approved. Additional information will be provided in the approval communication. (Message 1142)  Approval Details    Authorized from 2025 to May 21, 2026  Patient, pharmacy and provider notified.

## 2025-05-27 ENCOUNTER — APPOINTMENT (OUTPATIENT)
Dept: ORTHOPEDIC SURGERY | Facility: CLINIC | Age: 45
End: 2025-05-27
Payer: COMMERCIAL

## 2025-05-27 ENCOUNTER — HOSPITAL ENCOUNTER (OUTPATIENT)
Dept: RADIOLOGY | Facility: CLINIC | Age: 45
Discharge: HOME | End: 2025-05-27
Payer: COMMERCIAL

## 2025-05-27 DIAGNOSIS — M25.561 RIGHT KNEE PAIN, UNSPECIFIED CHRONICITY: ICD-10-CM

## 2025-05-27 DIAGNOSIS — M17.11 PRIMARY OSTEOARTHRITIS OF RIGHT KNEE: Primary | ICD-10-CM

## 2025-05-27 PROCEDURE — 2500000004 HC RX 250 GENERAL PHARMACY W/ HCPCS (ALT 636 FOR OP/ED): Performed by: ORTHOPAEDIC SURGERY

## 2025-05-27 PROCEDURE — 99214 OFFICE O/P EST MOD 30 MIN: CPT | Performed by: ORTHOPAEDIC SURGERY

## 2025-05-27 PROCEDURE — 73562 X-RAY EXAM OF KNEE 3: CPT | Mod: RT

## 2025-05-27 PROCEDURE — 99212 OFFICE O/P EST SF 10 MIN: CPT | Mod: 25 | Performed by: ORTHOPAEDIC SURGERY

## 2025-05-27 PROCEDURE — 20610 DRAIN/INJ JOINT/BURSA W/O US: CPT | Mod: RT | Performed by: ORTHOPAEDIC SURGERY

## 2025-05-27 PROCEDURE — 73562 X-RAY EXAM OF KNEE 3: CPT | Mod: RIGHT SIDE | Performed by: ORTHOPAEDIC SURGERY

## 2025-05-27 RX ORDER — HYDROCODONE BITARTRATE AND ACETAMINOPHEN 5; 325 MG/1; MG/1
1 TABLET ORAL EVERY 6 HOURS PRN
Qty: 5 TABLET | Refills: 0 | Status: SHIPPED | OUTPATIENT
Start: 2025-05-27 | End: 2025-06-03

## 2025-05-27 RX ORDER — LIDOCAINE HYDROCHLORIDE 10 MG/ML
8 INJECTION, SOLUTION INFILTRATION; PERINEURAL
Status: COMPLETED | OUTPATIENT
Start: 2025-05-27 | End: 2025-05-27

## 2025-05-27 RX ORDER — TRIAMCINOLONE ACETONIDE 40 MG/ML
40 INJECTION, SUSPENSION INTRA-ARTICULAR; INTRAMUSCULAR
Status: COMPLETED | OUTPATIENT
Start: 2025-05-27 | End: 2025-05-27

## 2025-05-27 RX ORDER — CELECOXIB 200 MG/1
200 CAPSULE ORAL DAILY
Qty: 30 CAPSULE | Refills: 0 | Status: SHIPPED | OUTPATIENT
Start: 2025-05-27 | End: 2025-06-26

## 2025-05-27 RX ADMIN — TRIAMCINOLONE ACETONIDE 40 MG: 40 INJECTION, SUSPENSION INTRA-ARTICULAR; INTRAMUSCULAR at 09:42

## 2025-05-27 RX ADMIN — LIDOCAINE HYDROCHLORIDE 8 ML: 10 INJECTION, SOLUTION INFILTRATION; PERINEURAL at 09:42

## 2025-05-27 NOTE — PROGRESS NOTES
History of Present Illness:  DOS: 03/27/24 Right knee arthroscopy medial meniscectomy (medial 15%/lateral 10%). His knee is consistently bothering him. It is sore and swollen mostly after working. He asks about a cortisone injection today. He does not feel that anti-inflammatories give him much relief.     Imaging:  X-rays right knee: Show mild medial compartment arthritis grade 2.       Assessment:  Right knee arthritis flare s/p knee arthroscopy partial medial meniscectomy      Plan:  Reviewed arthroscopic photos and findings.  We gave him a cortisone injection in the right knee today. We discussed potential for gel injections in the future. He will call  us to submit for approval.   Home exercise handout given  Ice: 60 minutes consistently  He is having a severe acute flare up in his pain and complaints. We prescribed Norco for acute pain relief. He understands that this will be a one-time prescription and if he needs it refilled he will need to see pain management.   Celebrex consistently for one week then as needed.   Follow-up in 4-6 weeks.     L Inj/Asp: R knee on 5/27/2025 9:42 AM  Indications: pain  Details: 22 G needle, anterolateral approach  Medications: 8 mL lidocaine 10 mg/mL (1 %); 40 mg triamcinolone acetonide 40 mg/mL  Outcome: tolerated well, no immediate complications  Procedure, treatment alternatives, risks and benefits explained, specific risks discussed. Consent was given by the patient. Immediately prior to procedure a time out was called to verify the correct patient, procedure, equipment, support staff and site/side marked as required. Patient was prepped and draped in the usual sterile fashion.          Exam:  Mild effusion  Healed incisions   No calf swelling  Negative Daquan´s test  Distal neurovascular exam intact  Flex to 120  Pain with patellar grind   Pain along medial joint line    Review of Systems:  GENERAL: Negative for malaise, significant weight loss, fever  MUSCULOSKELETAL:  See HPI  NEURO:  Negative         Past Medical History:   Diagnosis Date    COVID-19 03/25/2023    Elevated liver enzymes 03/25/2023    PONV (postoperative nausea and vomiting)     PTSD (post-traumatic stress disorder)        Medication Documentation Review Audit       Reviewed by Viv Vega MA (Medical Assistant) on 07/01/24 at 0957      Medication Order Taking? Sig Documenting Provider Last Dose Status   diazePAM (Valium) 5 mg tablet [id number]  Take 1 tablet (5 mg) by mouth 1 time for 1 dose. Roxy Oneil MD  Active   ergocalciferol (Vitamin D-2) 1.25 MG (61715 UT) capsule [id number] No Take 1 capsule (1,250 mcg) by mouth 1 (one) time per week. Historical Provider, MD Past Week Active   methylPREDNISolone (Medrol Dospak) 4 mg tablets [id number]  TAKE PER HUI INSTRUCTIONS Historical Provider, MD  Active   PARoxetine CR (Paxil CR) 25 mg 24 hr tablet [id number] No Take 2 tablets (50 mg) by mouth once daily. Do not crush, chew, or split. Roxy Oneil MD 3/26/2024 Active   topiramate (Topamax) 50 mg tablet [id number] No Take 1 tablet (50 mg) by mouth 2 times a day. Roxy Oneil MD 3/26/2024 Active                    No Known Allergies    Social History     Socioeconomic History    Marital status:      Spouse name: Not on file    Number of children: Not on file    Years of education: Not on file    Highest education level: Not on file   Occupational History    Not on file   Tobacco Use    Smoking status: Never    Smokeless tobacco: Never   Vaping Use    Vaping status: Never Used   Substance and Sexual Activity    Alcohol use: Not Currently    Drug use: Not Currently    Sexual activity: Not Currently   Other Topics Concern    Not on file   Social History Narrative    Not on file     Social Drivers of Health     Financial Resource Strain: Not on file   Food Insecurity: Not on file   Transportation Needs: Not on file   Physical Activity: Not on file   Stress: Not on file   Social Connections: Not on file    Intimate Partner Violence: Not on file   Housing Stability: Not on file       Past Surgical History:   Procedure Laterality Date    KIDNEY SURGERY      reflex valve as child    MENISCECTOMY Right 02/01/2021     Scribe Attestation:  By signing my name below, I, Kareem Michelle attest that this documentation has been prepared under the direction and in the presence of Mehul Corrales MD.    Provider Attestation - Scribe documentation:  All medical record entries made by Daija Serna were at my direction and personally dictated by me, Mehul Corrales MD. I have reviewed the chart and agree that the record is accurate and I confirmed that it reflects my personal performance of the history, physical exam, discussion, and plan.

## 2025-06-13 ENCOUNTER — APPOINTMENT (OUTPATIENT)
Dept: BEHAVIORAL HEALTH | Facility: CLINIC | Age: 45
End: 2025-06-13
Payer: COMMERCIAL

## 2025-06-23 DIAGNOSIS — M17.11 PRIMARY OSTEOARTHRITIS OF RIGHT KNEE: ICD-10-CM

## 2025-06-24 RX ORDER — CELECOXIB 200 MG/1
CAPSULE ORAL
Qty: 30 CAPSULE | Refills: 0 | Status: SHIPPED | OUTPATIENT
Start: 2025-06-24

## 2025-06-30 ENCOUNTER — TELEMEDICINE (OUTPATIENT)
Dept: BEHAVIORAL HEALTH | Facility: CLINIC | Age: 45
End: 2025-06-30
Payer: COMMERCIAL

## 2025-06-30 DIAGNOSIS — G47.26 SHIFT WORK SLEEP DISORDER: ICD-10-CM

## 2025-06-30 DIAGNOSIS — F43.10 POSTTRAUMATIC STRESS DISORDER: ICD-10-CM

## 2025-06-30 DIAGNOSIS — G47.26 CIRCADIAN RHYTHM SLEEP DISORDER, SHIFT WORK TYPE: ICD-10-CM

## 2025-06-30 PROCEDURE — 1036F TOBACCO NON-USER: CPT | Performed by: STUDENT IN AN ORGANIZED HEALTH CARE EDUCATION/TRAINING PROGRAM

## 2025-06-30 PROCEDURE — 99215 OFFICE O/P EST HI 40 MIN: CPT | Performed by: STUDENT IN AN ORGANIZED HEALTH CARE EDUCATION/TRAINING PROGRAM

## 2025-06-30 RX ORDER — MODAFINIL 200 MG/1
TABLET ORAL
Qty: 60 TABLET | Refills: 0 | Status: SHIPPED | OUTPATIENT
Start: 2025-06-30

## 2025-06-30 NOTE — PROGRESS NOTES
Outpatient Psychiatry Follow-Up    Virtual or Telephone Consent  An interactive audio and video telecommunication system which permits real time communications between the patient (at the originating site) and provider (at the distant site) was utilized to provide this telehealth service.   Verbal consent was requested and obtained from Rafael Mar on this date, 06/30/25 for a telehealth visit and the patient's location was confirmed at the time of the visit.    Rafael Mar is a 44 y.o. male presenting for psychiatric follow-up.     Subjective   Previous Treatment Plan        ICD-10-CM     Posttraumatic stress disorder F43.10     Limited efficacy of medication, difficulty making consistent time for therapy. Paroxetine and Topiramate were only marginally helpful. Social stress is playing a major role. Didn't do well with trauma therapy, more interested in general support.  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 Modafinil had been helpful initially, worth trying again as long as he is currently unable to change jobs.  - UDS - once complete, restart Modafinil daily PRN working            Interim History  Wife moved out 2/2 spiders in the house and won't come back.  Brain is scrambled and can't communicate with anyone.  He talks to his vets and nothing works for the PTSD. Spoke to a vet who tried every medication and none of them worked.  He plans to quit his job soon. He's going to more support groups for PTSD.    He can't communicate with his wife. Feels like half his brain isn't there. His wife is long-winded talking to him and he can't deal with that. He has a short temper with her issues.     He can't slow down his thoughts, but for the last few weeks, has trouble thinking at  all or staying focused. Sleep is still terrible, he's amalia to get 4 hours uninterrupted.    Very bothered by a conversation with a [place] vet in the ER who reminded him of himself, and told him that there's no way to cure PTSD, just have to lean on other vets to talk about it. This particular patient is addicted to crack cocaine, and [facility]. He's afraid of ending up like them. He denies SI now, is the primary caregiver for his 2-year old daughter, and only wants the best for his children. Just wishes there was a medication that would make him feel better, but knows there isn't.    He understands and agrees with my recommendation, which is that he needs to find a way to get more sleep, as chronic and fairly severe sleep deprivation is feeding heavily into his distress. 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    He plans to stop working but needs another plan. 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 At the same time, feels driven to do something to help connect veterans with PTSD. We brainstormed other options.    He's going to start going to the [facility], [facility] and [facility].     Modafinil: Helping somewhat at work.        Objective   Mental Status Exam:  General Appearance: Fairly groomed  Attitude/Behavior: Cooperative  Motor: No psychomotor agitation or retardation, no tremor or other abnormal movements  Speech: Normal rate, volume, prosody  Mood: worst I've ever been  Affect: Sad/Tearful  Thought Process: Linear, goal directed  Thought Associations: No loosening of associations  Thought Content: Normal  Perception: No perceptual abnormalities noted  Sensorium: Alert  Insight: Intact  Judgement: Intact  Cognition: Cognitively intact to conversational testing with respect to  attention, orientation, fund of knowledge, recent and remote memory, and language    OARRS:  Modafinil 5/22  Roxy Oneil MD on 6/30/2025  4:31 PM  I have personally reviewed the OARRS report for Rafeal Mar. I have considered the risks of abuse, dependence, addiction and diversion  Is the patient prescribed a combination of a benzodiazepine and opioid?  No  Last Urine Drug Screen / ordered today: No  Recent Results (from the past 8760 hours)   Drug Screen, Urine With Reflex to Confirmation    Collection Time: 05/16/25  8:28 AM   Result Value Ref Range    Fentanyl NEGATIVE <0.5 ng/mL    Amphetamines NEGATIVE <500 ng/mL    Barbiturates NEGATIVE <300 ng/mL    Benzodiazepines NEGATIVE <100 ng/mL    Cocaine Metabolite NEGATIVE <150 ng/mL    Marijuana Metabolite NEGATIVE <20 ng/mL    Methadone Metabolite NEGATIVE <100 ng/mL    Opiates NEGATIVE <100 ng/mL    Oxycodone NEGATIVE <100 ng/mL    Phencyclidine NEGATIVE <25 ng/mL    Creatinine 84.2 > or = 20.0 mg/dL    pH 8.9 4.5 - 9.0    Oxidant NEGATIVE <200 mcg/mL    Notes and Comments       Results are as expected.     Lab Review:   not applicable       Assessment/Plan   Problem List Items Addressed This Visit           ICD-10-CM    Posttraumatic stress disorder F43.10    Limited efficacy of medication, difficulty making consistent time for therapy. Paroxetine and Topiramate were only marginally helpful; not interested in further med management. Social stress is playing a major role. Didn't do well with trauma therapy, more interested in general support. Increasing social stressors and sleep deprivation are complicating.  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 Modafinil had been helpful initially, worth  trying again as long as he is currently unable to change jobs.  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    Suicide Risk Assessment  Rafael Mar is currently at a medium chronic risk of suicide based on the data above.  Plan to Mitigate Risk: Frequency of therapeutic contact increased  The current level of care is appropriate to safely mitigate imminent risk of suicide.     Roxy Oneil MD

## 2025-06-30 NOTE — PATIENT INSTRUCTIONS
Please send me a message in [employer] if things aren't going as expected or you are unsure about something we discussed. If this isn't working, you can call the psychiatry department line at [phone], or leave me a voicemail at [phone].  If you are having thoughts of harming yourself or ending your life, please call the national suicide hotline 24/7 at 950. For this and other mental health emergencies, such as losing touch with reality, call the [facility] 24/7 mobile crisis hotline at 243-884-3331, or dial 911 for emergency services.

## 2025-06-30 NOTE — ASSESSMENT & PLAN NOTE
More difficulty at work related to less reliable sleep schedule. Modafinil had been helpful initially, worth trying again as long as he is currently unable to change jobs.  - continue Modafinil

## 2025-06-30 NOTE — ASSESSMENT & PLAN NOTE
Limited efficacy of medication, difficulty making consistent time for therapy. Paroxetine and Topiramate were only marginally helpful; not interested in further med management. Social stress is playing a major role. Didn't do well with trauma therapy, more interested in general support. Increasing social stressors and sleep deprivation are complicating.  - he will explore work alternatives to allow for a full night's sleep  - [facility] and [facility]

## 2025-07-01 ENCOUNTER — APPOINTMENT (OUTPATIENT)
Dept: ORTHOPEDIC SURGERY | Facility: CLINIC | Age: 45
End: 2025-07-01
Payer: COMMERCIAL

## 2025-07-16 ENCOUNTER — APPOINTMENT (OUTPATIENT)
Dept: BEHAVIORAL HEALTH | Facility: CLINIC | Age: 45
End: 2025-07-16
Payer: COMMERCIAL

## 2025-07-18 ENCOUNTER — APPOINTMENT (OUTPATIENT)
Dept: BEHAVIORAL HEALTH | Facility: CLINIC | Age: 45
End: 2025-07-18
Payer: COMMERCIAL

## 2025-07-18 DIAGNOSIS — F43.10 POSTTRAUMATIC STRESS DISORDER: ICD-10-CM

## 2025-07-18 DIAGNOSIS — G47.26 CIRCADIAN RHYTHM SLEEP DISORDER, SHIFT WORK TYPE: ICD-10-CM

## 2025-07-18 PROCEDURE — 99215 OFFICE O/P EST HI 40 MIN: CPT | Performed by: STUDENT IN AN ORGANIZED HEALTH CARE EDUCATION/TRAINING PROGRAM

## 2025-07-18 RX ORDER — MODAFINIL 200 MG/1
TABLET ORAL
Qty: 60 TABLET | Refills: 0 | Status: SHIPPED | OUTPATIENT
Start: 2025-07-18

## 2025-07-18 NOTE — PROGRESS NOTES
Outpatient Psychiatry Follow-Up    Virtual or Telephone Consent  An interactive audio and video telecommunication system which permits real time communications between the patient (at the originating site) and provider (at the distant site) was utilized to provide this telehealth service.   Verbal consent was requested and obtained from Rafael Mar on this date, 07/18/25 for a telehealth visit and the patient's location was confirmed at the time of the visit.    Rafael Mar is a 44 y.o. male presenting for psychiatric follow-up.     Subjective   Previous Treatment Plan       ICD-10-CM    Posttraumatic stress disorder F43.10    Limited efficacy of medication, difficulty making consistent time for therapy. Paroxetine and Topiramate were only marginally helpful; not interested in further med management. Social stress is playing a major role. Didn't do well with trauma therapy, more interested in general support. Increasing social stressors and sleep deprivation are complicating.  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 Modafinil had been helpful initially, worth trying again as long as he is currently unable to change jobs.  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 He is interested in seeing Dr. Joiner again once there's more certainty around the fallout from the divorce.        Objective   Mental Status Exam:  General Appearance: Well groomed, appropriate eye contact  Attitude/Behavior: Cooperative  Motor: No psychomotor agitation or retardation, no tremor or other abnormal movements  Speech: Normal rate, volume, prosody  Mood: not good  Affect: Dysphoric, constricted but reactive  Thought Process: Linear, goal directed  Thought Associations: No loosening of associations  Thought Content: Normal  Perception: No perceptual abnormalities noted  Sensorium: Alert  Insight: Intact  Judgement: Intact  Cognition: Cognitively intact to conversational testing with respect to attention, orientation, fund of knowledge, recent and remote memory, and language    Lab Review:   not applicable       Assessment/Plan   Problem List Items Addressed This Visit           ICD-10-CM    Posttraumatic stress disorder F43.10    Limited efficacy of medication, difficulty making consistent time for therapy. Paroxetine and Topiramate were only marginally helpful; not interested in further med management. Social stress is playing a major role. Didn't do well with trauma therapy, but would revisit when more socially stable, as well as ketamine for depression. High social stressors and sleep deprivation are complicating.  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 Modafinil has been helpful and he's financially unable to change jobs.  - continue Modafinil          Relevant Medications    modafinil (ProvigiL) 200 mg tablet          Next appointment with me in 3 week(s).    Suicide Risk Assessment  There are no new risk factors for suicide and imminent risk remains low; therefore,  Rafael Mar does not require further risk mitigation.    I provided the mobile crisis number and encouraged calling this, 988 or 911 in case of a mental health crisis, including feeling suicidal or losing touch with reality.    Roxy Oneil MD

## 2025-07-18 NOTE — ASSESSMENT & PLAN NOTE
More difficulty at work related to less reliable sleep schedule. Modafinil has been helpful and he's financially unable to change jobs.  - continue Modafinil

## 2025-07-18 NOTE — ASSESSMENT & PLAN NOTE
Limited efficacy of medication, difficulty making consistent time for therapy. Paroxetine and Topiramate were only marginally helpful; not interested in further med management. Social stress is playing a major role. Didn't do well with trauma therapy, but would revisit when more socially stable, as well as ketamine for depression. High social stressors and sleep deprivation are complicating.  - encourage to access to support through [employer] and look into establishing PTSD peer support group for veterans  - when more stable, look into restarting trauma therapy alongside ketamine therapy for depression

## 2025-07-18 NOTE — PATIENT INSTRUCTIONS
Here's the one I talked about:  https://www.[email or web address].org/    Also definitely worth a look:  https://www.woundedwarriorproject.org/programs/peer-support    Please send me a message in [employer] if things aren't going as expected or you are unsure about something we discussed. If this isn't working, you can call the psychiatry department line at [phone], or leave me a voicemail at [phone].  If you are having thoughts of harming yourself or ending your life, please call the national suicide hotline 24/7 at 740. For this and other mental health emergencies, such as losing touch with reality, call the [facility] 24/7 mobile crisis hotline at 539-873-1861, or dial 151 for emergency services.

## 2025-08-08 ENCOUNTER — APPOINTMENT (OUTPATIENT)
Dept: BEHAVIORAL HEALTH | Facility: CLINIC | Age: 45
End: 2025-08-08
Payer: COMMERCIAL

## 2025-08-08 DIAGNOSIS — G47.26 CIRCADIAN RHYTHM SLEEP DISORDER, SHIFT WORK TYPE: ICD-10-CM

## 2025-08-08 DIAGNOSIS — F43.10 POSTTRAUMATIC STRESS DISORDER: ICD-10-CM

## 2025-08-08 PROCEDURE — 99212 OFFICE O/P EST SF 10 MIN: CPT | Performed by: STUDENT IN AN ORGANIZED HEALTH CARE EDUCATION/TRAINING PROGRAM

## 2025-08-08 RX ORDER — MODAFINIL 200 MG/1
TABLET ORAL
Qty: 60 TABLET | Refills: 0 | Status: SHIPPED | OUTPATIENT
Start: 2025-08-08

## 2025-08-08 NOTE — ASSESSMENT & PLAN NOTE
More difficulty at work related to less reliable sleep schedule. Modafinil has been helpful and he's financially unable to change jobs, but working on this now.  - continue Modafinil

## 2025-08-08 NOTE — ASSESSMENT & PLAN NOTE
Limited efficacy of medication, difficulty making consistent time for therapy. Paroxetine and Topiramate were only marginally helpful; not interested in further med management. Social stress is playing a major role. Didn't do well with trauma therapy, but would revisit when more socially stable, as well as ketamine for depression. High social stressors and sleep deprivation have been complicating. He is doing much better while on a 3 week vacation.  - when more stable, look into restarting trauma therapy alongside ketamine therapy for depression

## 2025-08-08 NOTE — PATIENT INSTRUCTIONS
Please send me a message in [employer] if things aren't going as expected or you are unsure about something we discussed. If this isn't working, you can call the psychiatry department line at [phone], or leave me a voicemail at [phone].  If you are having thoughts of harming yourself or ending your life, please call the national suicide hotline 24/7 at 956. For this and other mental health emergencies, such as losing touch with reality, call the [facility] 24/7 mobile crisis hotline at 922-128-7624, or dial 911 for emergency services.

## 2025-08-08 NOTE — PROGRESS NOTES
Outpatient Psychiatry Follow-Up    Virtual or Telephone Consent  An interactive audio and video telecommunication system which permits real time communications between the patient (at the originating site) and provider (at the distant site) was utilized to provide this telehealth service.   Verbal consent was requested and obtained from Rafael Mar on this date, 08/08/25 for a telehealth visit and the patient's location was confirmed at the time of the visit.    Rafael Mar is a 44 y.o. male presenting for psychiatric follow-up.     Subjective   Previous Treatment Plan       ICD-10-CM    Posttraumatic stress disorder F43.10    Limited efficacy of medication, difficulty making consistent time for therapy. Paroxetine and Topiramate were only marginally helpful; not interested in further med management. Social stress is playing a major role. Didn't do well with trauma therapy, but would revisit when more socially stable, as well as ketamine for depression. High social stressors and sleep deprivation are complicating.  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 Modafinil has been helpful and he's financially unable to change jobs.  - continue Modafinil          Relevant Medications    modafinil (ProvigiL) 200 mg tablet     Interim History    He's on vacation, things are looking better with his wife, they're trying to sell the house and work things out while staying together.  Not doing [employer] or anything, no time for himself right now, really focused on family.  He understands I will be leaving in a few months and, for the moment, plans to switch to a [facility] at that  time.       Objective   Mental Status Exam:  General Appearance: Well groomed, appropriate eye contact  Attitude/Behavior: Cooperative  Motor: No psychomotor agitation or retardation, no tremor or other abnormal movements  Speech: Normal rate, volume, prosody  Mood: pretty good  Affect: Euthymic, full-range  Thought Process: Linear, goal directed  Thought Associations: No loosening of associations  Thought Content: Normal  Perception: No perceptual abnormalities noted  Sensorium: Alert  Insight: Intact  Judgement: Intact  Cognition: Cognitively intact to conversational testing with respect to attention, orientation, fund of knowledge, recent and remote memory, and language    OARRS:  Modafinil 8/3  Roxy Oneil MD on 8/8/2025 12:42 PM  I have personally reviewed the OARRS report for Rafael Mar. I have considered the risks of abuse, dependence, addiction and diversion  Is the patient prescribed a combination of a benzodiazepine and opioid?  No  Last Urine Drug Screen / ordered today: No  Recent Results (from the past 8760 hours)   Drug Screen, Urine With Reflex to Confirmation    Collection Time: 05/16/25  8:28 AM   Result Value Ref Range    Fentanyl NEGATIVE <0.5 ng/mL    Amphetamines NEGATIVE <500 ng/mL    Barbiturates NEGATIVE <300 ng/mL    Benzodiazepines NEGATIVE <100 ng/mL    Cocaine Metabolite NEGATIVE <150 ng/mL    Marijuana Metabolite NEGATIVE <20 ng/mL    Methadone Metabolite NEGATIVE <100 ng/mL    Opiates NEGATIVE <100 ng/mL    Oxycodone NEGATIVE <100 ng/mL    Phencyclidine NEGATIVE <25 ng/mL    Creatinine 84.2 > or = 20.0 mg/dL    pH 8.9 4.5 - 9.0    Oxidant NEGATIVE <200 mcg/mL    Notes and Comments       Results are as expected.     Lab Review:   not applicable       Assessment/Plan   Problem List Items Addressed This Visit           ICD-10-CM    Posttraumatic stress disorder F43.10    Limited efficacy of medication, difficulty making consistent time for therapy. Paroxetine and Topiramate were  only marginally helpful; not interested in further med management. Social stress is playing a major role. Didn't do well with trauma therapy, but would revisit when more socially stable, as well as ketamine for depression. High social stressors and sleep deprivation have been complicating. He is doing much better while on a 3 week vacation.  - when more stable, look into restarting trauma therapy alongside ketamine therapy for depression         Relevant Orders    Follow Up In Psychiatry    Circadian rhythm sleep disorder, shift work type G47.26    More difficulty at work related to less reliable sleep schedule. Modafinil has been helpful and he's financially unable to change jobs, but working on this now.  - continue Modafinil          Relevant Medications    modafinil (ProvigiL) 200 mg tablet          Next appointment with me in 6 week(s).    Suicide Risk Assessment  There are no new risk factors for suicide and imminent risk remains low; therefore, Rafaelмарина Mar does not require further risk mitigation.    I provided the mobile crisis number and encouraged calling this, [phone] or 911 in case of a mental health crisis, including feeling suicidal or losing touch with reality.    Roxy Oneil MD

## 2025-09-17 ENCOUNTER — APPOINTMENT (OUTPATIENT)
Dept: BEHAVIORAL HEALTH | Facility: CLINIC | Age: 45
End: 2025-09-17
Payer: COMMERCIAL

## (undated) DEVICE — GOWN, SURGICAL, ROYAL SILK, LG, STERILE

## (undated) DEVICE — Device

## (undated) DEVICE — COVER, MAYO STAND, W/PAD, 23 IN, DISPOSABLE, PLASTIC, LF, STERILE

## (undated) DEVICE — TUBING, SUCTION, 6MM X 10, CLEAN N-COND

## (undated) DEVICE — DRESSING, GAUZE, SPONGE, 12 PLY, 4 X 4 IN, PLASTIC POUCH, STRL 10PK

## (undated) DEVICE — DRESSING, ABDOMINAL PAD, CURITY, 7.5 X 8 IN

## (undated) DEVICE — PADDING, CAST, SPECIALIST, 6 IN X 4 YD, STERILE

## (undated) DEVICE — STRAP, ARM BOARD, 32 X 1.5

## (undated) DEVICE — STRIP, SKIN CLOSURE, STERI STRIP, REINFORCED, 0.5 X 4 IN

## (undated) DEVICE — SUTURE, VICRYL, 2-0, 27 IN, X-1, UNDYED

## (undated) DEVICE — GOWN, SURGICAL, IMPLT, BACK, XLARGE, XLONG, STERILE

## (undated) DEVICE — PREP, IODOPHOR, W/ALCOHOL, DURAPREP, W/APPLICATOR, 26 CC

## (undated) DEVICE — GOWN, SURGICAL, ROYAL SILK, XL, STERILE

## (undated) DEVICE — PROBE, SERFAS, 3.5MM, 50 S, ENERGY SUCTION SYSTEM

## (undated) DEVICE — GLOVE, SURGICAL, PROTEXIS PI , 6.5, PF, LF

## (undated) DEVICE — TOWELS 4-PK

## (undated) DEVICE — STRAP, VELCRO, BODY, 4 X 60IN, NS

## (undated) DEVICE — HANDLE, LITE EZ, PLASTIC, DISP, LF

## (undated) DEVICE — TUBING, PATIENT 8FT STERILE

## (undated) DEVICE — BANDAGE, ELASTIC, 6 X 10YD, BEIGE, LF

## (undated) DEVICE — SOLUTION, TOPICAL, ALCOHOL, ISOPROPYL 70%, 16 OZ

## (undated) DEVICE — MAT, FLOOR, STANDARD FLUID BARRIER, 32X44, GREEN

## (undated) DEVICE — GLOVE, SURGICAL, PROTEXIS PI , 7.0, PF, LF

## (undated) DEVICE — MANIFOLD, 4 PORT NEPTUNE STANDARD

## (undated) DEVICE — GLOVE, SURGICAL, PROTEXIS PI , 6.0, PF, LF

## (undated) DEVICE — GLOVE, SURGICAL, ORTHO, PROTEXIS, HYDROGEL, 8.0, PF, LATEX

## (undated) DEVICE — TUBING, PUMP REDEUCE 8FT STERILE

## (undated) DEVICE — GLOVE, SURGICAL, PROTEXIS PI BLUE W/NEUTHERA, 8.0, PF, LF